# Patient Record
Sex: MALE | Race: OTHER | HISPANIC OR LATINO | Employment: UNEMPLOYED | URBAN - METROPOLITAN AREA
[De-identification: names, ages, dates, MRNs, and addresses within clinical notes are randomized per-mention and may not be internally consistent; named-entity substitution may affect disease eponyms.]

---

## 2018-10-09 ENCOUNTER — IMMUNIZATION (OUTPATIENT)
Dept: FAMILY MEDICINE CLINIC | Facility: CLINIC | Age: 28
End: 2018-10-09

## 2018-10-09 DIAGNOSIS — Z23 NEED FOR INFLUENZA VACCINATION: Primary | ICD-10-CM

## 2019-10-03 ENCOUNTER — IMMUNIZATIONS (OUTPATIENT)
Dept: FAMILY MEDICINE CLINIC | Facility: CLINIC | Age: 29
End: 2019-10-03
Payer: COMMERCIAL

## 2019-10-03 DIAGNOSIS — Z23 NEED FOR INFLUENZA VACCINATION: Primary | ICD-10-CM

## 2019-10-03 PROCEDURE — 90686 IIV4 VACC NO PRSV 0.5 ML IM: CPT

## 2019-10-03 PROCEDURE — 90471 IMMUNIZATION ADMIN: CPT

## 2020-10-08 ENCOUNTER — CLINICAL SUPPORT (OUTPATIENT)
Dept: FAMILY MEDICINE CLINIC | Facility: CLINIC | Age: 30
End: 2020-10-08
Payer: COMMERCIAL

## 2020-10-08 VITALS — TEMPERATURE: 97.1 F

## 2020-10-08 DIAGNOSIS — Z23 FLU VACCINE NEED: Primary | ICD-10-CM

## 2020-10-08 PROCEDURE — 90686 IIV4 VACC NO PRSV 0.5 ML IM: CPT

## 2020-10-08 PROCEDURE — 90471 IMMUNIZATION ADMIN: CPT

## 2020-10-27 ENCOUNTER — OFFICE VISIT (OUTPATIENT)
Dept: FAMILY MEDICINE CLINIC | Facility: CLINIC | Age: 30
End: 2020-10-27
Payer: COMMERCIAL

## 2020-10-27 VITALS
BODY MASS INDEX: 20.94 KG/M2 | OXYGEN SATURATION: 96 % | HEIGHT: 73 IN | SYSTOLIC BLOOD PRESSURE: 110 MMHG | DIASTOLIC BLOOD PRESSURE: 60 MMHG | HEART RATE: 70 BPM | WEIGHT: 158 LBS | TEMPERATURE: 98.2 F

## 2020-10-27 DIAGNOSIS — Z00.00 ENCOUNTER FOR ANNUAL PHYSICAL EXAM: Primary | ICD-10-CM

## 2020-10-27 DIAGNOSIS — J30.9 ALLERGIC RHINITIS, UNSPECIFIED SEASONALITY, UNSPECIFIED TRIGGER: ICD-10-CM

## 2020-10-27 DIAGNOSIS — Z23 NEED FOR TDAP VACCINATION: ICD-10-CM

## 2020-10-27 PROCEDURE — 90471 IMMUNIZATION ADMIN: CPT

## 2020-10-27 PROCEDURE — 99395 PREV VISIT EST AGE 18-39: CPT | Performed by: FAMILY MEDICINE

## 2020-10-27 PROCEDURE — 90715 TDAP VACCINE 7 YRS/> IM: CPT

## 2020-10-27 PROCEDURE — 3725F SCREEN DEPRESSION PERFORMED: CPT | Performed by: FAMILY MEDICINE

## 2020-10-27 PROCEDURE — 3008F BODY MASS INDEX DOCD: CPT | Performed by: FAMILY MEDICINE

## 2020-10-27 RX ORDER — ALBUTEROL SULFATE 90 UG/1
1-2 AEROSOL, METERED RESPIRATORY (INHALATION)
COMMUNITY
Start: 2012-10-02 | End: 2021-09-13 | Stop reason: SDUPTHER

## 2020-10-27 RX ORDER — LEVOCETIRIZINE DIHYDROCHLORIDE 5 MG/1
5 TABLET, FILM COATED ORAL EVERY EVENING
Qty: 90 TABLET | Refills: 1 | Status: SHIPPED | OUTPATIENT
Start: 2020-10-27

## 2021-04-13 DIAGNOSIS — Z23 ENCOUNTER FOR IMMUNIZATION: ICD-10-CM

## 2021-08-30 ENCOUNTER — APPOINTMENT (EMERGENCY)
Dept: RADIOLOGY | Facility: HOSPITAL | Age: 31
End: 2021-08-30
Payer: COMMERCIAL

## 2021-08-30 ENCOUNTER — HOSPITAL ENCOUNTER (EMERGENCY)
Facility: HOSPITAL | Age: 31
Discharge: HOME | End: 2021-08-30
Attending: EMERGENCY MEDICINE | Admitting: EMERGENCY MEDICINE
Payer: COMMERCIAL

## 2021-08-30 VITALS
OXYGEN SATURATION: 98 % | DIASTOLIC BLOOD PRESSURE: 72 MMHG | RESPIRATION RATE: 18 BRPM | TEMPERATURE: 98.8 F | SYSTOLIC BLOOD PRESSURE: 116 MMHG | HEART RATE: 76 BPM

## 2021-08-30 DIAGNOSIS — R07.89 ATYPICAL CHEST PAIN: Primary | ICD-10-CM

## 2021-08-30 LAB
ANION GAP SERPL CALCULATED.3IONS-SCNC: 7 MMOL/L (ref 4–13)
ATRIAL RATE: 76 BPM
BASOPHILS # BLD AUTO: 0.02 THOUSANDS/ΜL (ref 0–0.1)
BASOPHILS NFR BLD AUTO: 1 % (ref 0–1)
BUN SERPL-MCNC: 23 MG/DL (ref 5–25)
CALCIUM SERPL-MCNC: 9.1 MG/DL (ref 8.3–10.1)
CHLORIDE SERPL-SCNC: 103 MMOL/L (ref 100–108)
CO2 SERPL-SCNC: 30 MMOL/L (ref 21–32)
CREAT SERPL-MCNC: 0.82 MG/DL (ref 0.6–1.3)
EOSINOPHIL # BLD AUTO: 0.13 THOUSAND/ΜL (ref 0–0.61)
EOSINOPHIL NFR BLD AUTO: 4 % (ref 0–6)
ERYTHROCYTE [DISTWIDTH] IN BLOOD BY AUTOMATED COUNT: 11.7 % (ref 11.6–15.1)
GFR SERPL CREATININE-BSD FRML MDRD: 117 ML/MIN/1.73SQ M
GLUCOSE SERPL-MCNC: 98 MG/DL (ref 65–140)
HCT VFR BLD AUTO: 44.3 % (ref 36.5–49.3)
HGB BLD-MCNC: 14.9 G/DL (ref 12–17)
IMM GRANULOCYTES # BLD AUTO: 0 THOUSAND/UL (ref 0–0.2)
IMM GRANULOCYTES NFR BLD AUTO: 0 % (ref 0–2)
LYMPHOCYTES # BLD AUTO: 1.17 THOUSANDS/ΜL (ref 0.6–4.47)
LYMPHOCYTES NFR BLD AUTO: 40 % (ref 14–44)
MCH RBC QN AUTO: 33 PG (ref 26.8–34.3)
MCHC RBC AUTO-ENTMCNC: 33.6 G/DL (ref 31.4–37.4)
MCV RBC AUTO: 98 FL (ref 82–98)
MONOCYTES # BLD AUTO: 0.6 THOUSAND/ΜL (ref 0.17–1.22)
MONOCYTES NFR BLD AUTO: 20 % (ref 4–12)
NEUTROPHILS # BLD AUTO: 1.04 THOUSANDS/ΜL (ref 1.85–7.62)
NEUTS SEG NFR BLD AUTO: 35 % (ref 43–75)
NRBC BLD AUTO-RTO: 0 /100 WBCS
P AXIS: 71 DEGREES
PLATELET # BLD AUTO: 132 THOUSANDS/UL (ref 149–390)
PMV BLD AUTO: 11.9 FL (ref 8.9–12.7)
POTASSIUM SERPL-SCNC: 4.1 MMOL/L (ref 3.5–5.3)
PR INTERVAL: 166 MS
QRS AXIS: 96 DEGREES
QRSD INTERVAL: 102 MS
QT INTERVAL: 376 MS
QTC INTERVAL: 423 MS
RBC # BLD AUTO: 4.52 MILLION/UL (ref 3.88–5.62)
SODIUM SERPL-SCNC: 140 MMOL/L (ref 136–145)
T WAVE AXIS: 75 DEGREES
TROPONIN I SERPL-MCNC: <0.02 NG/ML
VENTRICULAR RATE: 76 BPM
WBC # BLD AUTO: 2.96 THOUSAND/UL (ref 4.31–10.16)

## 2021-08-30 PROCEDURE — 84484 ASSAY OF TROPONIN QUANT: CPT | Performed by: EMERGENCY MEDICINE

## 2021-08-30 PROCEDURE — 93010 ELECTROCARDIOGRAM REPORT: CPT | Performed by: INTERNAL MEDICINE

## 2021-08-30 PROCEDURE — 99284 EMERGENCY DEPT VISIT MOD MDM: CPT | Performed by: EMERGENCY MEDICINE

## 2021-08-30 PROCEDURE — 93005 ELECTROCARDIOGRAM TRACING: CPT

## 2021-08-30 PROCEDURE — 80048 BASIC METABOLIC PNL TOTAL CA: CPT | Performed by: EMERGENCY MEDICINE

## 2021-08-30 PROCEDURE — 99285 EMERGENCY DEPT VISIT HI MDM: CPT

## 2021-08-30 PROCEDURE — 36415 COLL VENOUS BLD VENIPUNCTURE: CPT | Performed by: EMERGENCY MEDICINE

## 2021-08-30 PROCEDURE — 71046 X-RAY EXAM CHEST 2 VIEWS: CPT

## 2021-08-30 PROCEDURE — 85025 COMPLETE CBC W/AUTO DIFF WBC: CPT | Performed by: EMERGENCY MEDICINE

## 2021-08-30 NOTE — ED PROVIDER NOTES
Emergency Department Note- [facility] 27 y o  male MRN: [id number]    Unit/Bed#: ED 30 Encounter: [id number]        History of Present Illness     Patient is a 42-year-old male patient, 10:00 p m  Last evening began feeling his chest was somewhat heavy, felt like he had just exercise when he had not  He said overall he thinks he is getting better now but still has some slight heaviness  The discomfort just feels like a pressure, it is not pleuritic in nature, not knife-like, ripping, or tearing  Discomfort is mild in severity, worse with nothing and better with nothing  Does not radiate anywhere  No associated nausea or vomiting or diaphoresis  Says he has a remote history of asthma, used his inhaler once about 2:00 a m  To see if that would help but it did not seem to help  He has no travel history, no sick contacts, no recent hospitalizations  Patient denies any prolonged travel history, no recent long travel, no immobilizations, or hospitalizations  Is vaccinated against COVID, 2nd dose was April 2021      REVIEW OF SYSTEMS   Constitutional:  No recent weight  gains or losses   Eyes:  No visual changes   ENT:  No tinnitus or hearing changes   Cardiac: As per HPI   Respiratory:  As per HPI   Abdominal:  No nausea or vomiting   Urinary: No dysuria or hematuria   Hematologic: No easy bruising or bleeding   Skin: No rash   Musculoskeletal: No aches or pains   Neurologic: No weakness or sensory changes   Psychiatric: No mood changes      Historical Information   Past Medical History:   Diagnosis Date    Asthma      History reviewed  No pertinent surgical history    Social History   Social History     Substance and Sexual Activity   Alcohol Use Not Currently     Social History     Substance and Sexual Activity   Drug Use Never     Social History     Tobacco Use   Smoking Status Never Smoker   Smokeless Tobacco Never Used     Family History:   Family History   Problem Relation Age of Onset    Arthritis Mother     Hypertension Father     Stroke Maternal Grandfather        MEDICATIONS:  No current facility-administered medications on file prior to encounter       Current Outpatient Medications on File Prior to Encounter   Medication Sig Dispense Refill    albuterol (Ventolin HFA) 90 mcg/act inhaler Inhale 1-2 puffs      levocetirizine (XYZAL) 5 MG tablet Take 1 tablet (5 mg total) by mouth every evening ( for allergies ) 90 tablet 1     ALLERGIES:  No Known Allergies    Vitals:    08/30/21 1429   BP: 137/80   TempSrc: Oral   Pulse: 73   Resp: 16   Patient Position - Orthostatic VS: Lying   Temp: 98 8 °F (37 1 °C)       PHYSICAL EXAM    General:  Patient is well-appearing  Head:  Atraumatic  Eyes:  Conjunctiva pink  ENT:  Mucous membranes are moist  Neck:  Supple  Cardiac:  S1-S2, without murmurs  Lungs:  Clear to auscultation bilaterally  Abdomen:  Soft, nontender, normal bowel sounds, no CVA tenderness, no tympany, no rigidity, no guarding  Extremities:  Normal range of motion, no pedal edema or calf asymmetry, radial pulses are equal and symmetric bilaterally  Neurologic:  Awake, fluent speech, normal comprehension, AAOx3  Skin:  Pink warm and dry  Psychiatric:  Alert, pleasant, cooperative      Labs Reviewed   CBC AND DIFFERENTIAL - Abnormal       Result Value Ref Range Status    WBC 2 96 (*) 4 31 - 10 16 Thousand/uL Final    RBC 4 52  3 88 - 5 62 Million/uL Final    Hemoglobin 14 9  12 0 - 17 0 g/dL Final    Hematocrit 44 3  36 5 - 49 3 % Final    MCV 98  82 - 98 fL Final    MCH 33 0  26 8 - 34 3 pg Final    MCHC 33 6  31 4 - 37 4 g/dL Final    RDW 11 7  11 6 - 15 1 % Final    MPV 11 9  8 9 - 12 7 fL Final    Platelets 744 (*) 829 - 390 Thousands/uL Final    nRBC 0  /100 WBCs Final    Neutrophils Relative 35 (*) 43 - 75 % Final    Immat GRANS % 0  0 - 2 % Final    Lymphocytes Relative 40  14 - 44 % Final    Monocytes Relative 20 (*) 4 - 12 % Final    Eosinophils Relative 4  0 - 6 % Final    Basophils Relative 1  0 - 1 % Final    Neutrophils Absolute 1 04 (*) 1 85 - 7 62 Thousands/µL Final    Immature Grans Absolute 0 00  0 00 - 0 20 Thousand/uL Final    Lymphocytes Absolute 1 17  0 60 - 4 47 Thousands/µL Final    Monocytes Absolute 0 60  0 17 - 1 22 Thousand/µL Final    Eosinophils Absolute 0 13  0 00 - 0 61 Thousand/µL Final    Basophils Absolute 0 02  0 00 - 0 10 Thousands/µL Final   TROPONIN I - Normal    Troponin I <0 02  <=0 04 ng/mL Final    Comment: 3Autovalidation override  [employer] Chemistry analyzer 99% cutoff is > 0 04 ng/mL in network labs     o cTnI 99% cutoff is useful only when applied to patients in the clinical setting of myocardial ischemia   o cTnI 99% cutoff should be interpreted in the context of clinical history, ECG findings and possibly cardiac imaging to establish correct diagnosis  o cTnI 99% cutoff may be suggestive but clearly not indicative of a coronary event without the clinical setting of myocardial ischemia  BASIC METABOLIC PANEL    Sodium 638  136 - 145 mmol/L Final    Potassium 4 1  3 5 - 5 3 mmol/L Final    Chloride 103  100 - 108 mmol/L Final    CO2 30  21 - 32 mmol/L Final    ANION GAP 7  4 - 13 mmol/L Final    BUN 23  5 - 25 mg/dL Final    Creatinine 0 82  0 60 - 1 30 mg/dL Final    Comment: Standardized to IDMS reference method    Glucose 98  65 - 140 mg/dL Final    Comment: If the patient is fasting, the ADA then defines impaired fasting glucose as > 100 mg/dL and diabetes as > or equal to 123 mg/dL  Specimen collection should occur prior to Sulfasalazine administration due to the potential for falsely depressed results  Specimen collection should occur prior to Sulfapyridine administration due to the potential for falsely elevated results      Calcium 9 1  8 3 - 10 1 mg/dL Final    eGFR 117  ml/min/1 73sq m Final    Narrative:     [place] guidelines for Chronic Kidney Disease (CKD):     Stage 1 with normal or high GFR (GFR > 90 mL/min/1 73 square meters)    Stage 2 Mild CKD (GFR = 60-89 mL/min/1 73 square meters)    Stage 3A Moderate CKD (GFR = 45-59 mL/min/1 73 square meters)    Stage 3B Moderate CKD (GFR = 30-44 mL/min/1 73 square meters)    Stage 4 Severe CKD (GFR = 15-29 mL/min/1 73 square meters)    Stage 5 End Stage CKD (GFR <15 mL/min/1 73 square meters)  Note: GFR calculation is accurate only with a steady state creatinine       Medications - No data to display    XR chest pa & lateral   ED Interpretation   Chest x-ray interpreted me shows no acute cardiopulmonary disease, no mediastinal widening, no old available for comparison          ED Course as of Aug 30 1619   Mon Aug 30, 2021   1546 ECG interpreted by me, sinus rhythm, rate of 76, no ST segment elevation or depression, no ischemic or infarct changes, no old available for comparison      1546 Patient is a very slight thrombocytopenia and neutropenia, no old lab eyes available for comparison      1550 On reassessment, no change the above findings      1605 On reassessment, there is no change with the above findings  Discussed with the patient about the slightly low platelet count and white blood cell count  Stressed the importance of following with primary care physician during reassessment with regards to this  Assessment/Plan     ED Medical Decision Making:    Based on the HEART score of 0, the patient is at low risk (1 7% or less) for major adverse cardiac events (MACE) in the next 6 weeks  The risks of MACE, the potential benefits of hospitalization (increased diagnostic accurary) as well as potential harms of hospitalization (iatrogenic injury, false positive test results, nococomial infection risk) were discussed  Based on current guidelines, I believe that the best course of action would be to discharge the patient and follow up as an outpatient   The patient said they understood that even with the low HEART score there was the small potential that their current symptoms were due to a cardiac event, but they were comfortable with the low risk and they decided that they wanted to be discharged from the ED and follow up as an outpatient  I do not believe this patient's complaints are from pulmonary embolism or aortic dissection and I believe they would most likely be harmed through false positive test results and other associated test complications by further pursuing the diagnosis of pulmonary embolism or dissection  While the cause of the patient's complaints is most likely benign, it is possible that this is the early presentation of a more serious condition  This diagnostic uncertainty was discussed with the patient, the importance of follow up care, as well as the need to return to immediately return to the closest emergency department for concerning signs and symptoms  The patient stated they were aware of this diagnostic uncertainty, understood the importance of follow up and were comfortable being discharged  I believe that discharge home with outpatient follow up for further evaluation is medically appropriate  Supportive care, importance of follow-up and return precautions were discussed with the patient, who expressed understanding  Time reflects when diagnosis was documented in both MDM as applicable and the Disposition within this note     Time User Action Codes Description Comment    8/30/2021  4:16 PM Mary Grace Tran [R07 [id number]] Atypical chest pain       ED Disposition     ED Disposition Condition Date/Time Comment    Discharge Stable Mon Aug 30, 2021  4:16 PM [facility] discharge to home/self care              Follow-up Information     Follow up With Specialties Details Why Contact Info    Kashif Smith DO Family Medicine  Schedule an appointment to be seen for follow-up in the next 3-5 days [address] [facility] [address]  [phone]            New Prescriptions    No medications on file            Graciela Samayoa DO  08/30/21 1449 [facility]

## 2021-08-31 ENCOUNTER — OFFICE VISIT (OUTPATIENT)
Dept: FAMILY MEDICINE CLINIC | Facility: CLINIC | Age: 31
End: 2021-08-31
Payer: COMMERCIAL

## 2021-08-31 VITALS
HEIGHT: 73 IN | BODY MASS INDEX: 20.67 KG/M2 | WEIGHT: 156 LBS | OXYGEN SATURATION: 97 % | SYSTOLIC BLOOD PRESSURE: 112 MMHG | HEART RATE: 62 BPM | DIASTOLIC BLOOD PRESSURE: 80 MMHG

## 2021-08-31 DIAGNOSIS — R07.89 ATYPICAL CHEST PAIN: ICD-10-CM

## 2021-08-31 DIAGNOSIS — J45.40 MODERATE PERSISTENT REACTIVE AIRWAY DISEASE WITHOUT COMPLICATION: ICD-10-CM

## 2021-08-31 DIAGNOSIS — D69.6 THROMBOCYTOPENIA (HCC): ICD-10-CM

## 2021-08-31 PROCEDURE — 99213 OFFICE O/P EST LOW 20 MIN: CPT | Performed by: INTERNAL MEDICINE

## 2021-08-31 PROCEDURE — 3725F SCREEN DEPRESSION PERFORMED: CPT | Performed by: INTERNAL MEDICINE

## 2021-08-31 PROCEDURE — 3008F BODY MASS INDEX DOCD: CPT | Performed by: INTERNAL MEDICINE

## 2021-08-31 PROCEDURE — 1036F TOBACCO NON-USER: CPT | Performed by: INTERNAL MEDICINE

## 2021-08-31 NOTE — PROGRESS NOTES
Assessment/Plan:    No problem-specific Assessment & Plan notes found for this encounter  Diagnoses and all orders for this visit:    BMI 20 0-20 9, adult    Thrombocytopenia ([facility] [address] )  Comments:  Repeat CBC in 6 months  Orders:  -     CBC and differential; Future    Moderate persistent reactive airway disease without complication  Comments:  Relatively well controlled  Continue albuterol as needed  Atypical chest pain  Comments:  Negative troponin, EKG and x-ray of the chest   No exertional symptoms  Can be due to GERD/esophageal spasm  He will let us know if it will recur  Subjective:      Patient ID: Jewell Springer is a 27 y o  male  Patient came to follow-up after his recent visit to emergency room, he had episode of substernal pressure-like chest pain when he woke up at night  His troponin was negative  EKG did not reveal any acute changes  X-ray of his chest was also negative  He exercises regularly and he never had any exertional symptoms in terms of the chest pain, shortness of breath or palpitations  His blood work revealed normal electrolytes and kidney function, his cell count showed decreased white blood cell count and platelets  The following portions of the patient's history were reviewed and updated as appropriate: allergies, current medications, past family history, past medical history, past social history, past surgical history, and problem list     Review of Systems   Constitutional: Negative for chills, fatigue and fever  Respiratory: Negative for cough, chest tightness and shortness of breath  Cardiovascular: Negative for chest pain, palpitations and leg swelling  Gastrointestinal: Negative for abdominal distention, abdominal pain, blood in stool, constipation, diarrhea and nausea  Genitourinary: Negative for difficulty urinating and dysuria  Musculoskeletal: Negative for arthralgias, back pain, gait problem, joint swelling, myalgias and neck pain  Skin: Negative for rash  Neurological: Negative for dizziness, weakness, numbness and headaches  Psychiatric/Behavioral: Negative for agitation  Objective:      /80 (BP Location: Left arm, Patient Position: Sitting, Cuff Size: Adult)   Pulse 62   Ht 6' 1" (1 854 m)   Wt 70 8 kg (156 lb)   SpO2 97%   BMI 20 58 kg/m²          Physical Exam  Constitutional:       Appearance: He is not ill-appearing  HENT:      Head: Normocephalic and atraumatic  Nose: No congestion or rhinorrhea  Eyes:      Pupils: Pupils are equal, round, and reactive to light  Cardiovascular:      Rate and Rhythm: Normal rate and regular rhythm  Pulses: Normal pulses  Heart sounds: Normal heart sounds  No murmur heard  No friction rub  No gallop  Pulmonary:      Effort: Pulmonary effort is normal  No respiratory distress  Breath sounds: Normal breath sounds  No wheezing or rales  Chest:      Chest wall: No tenderness  Abdominal:      General: Bowel sounds are normal  There is no distension  Palpations: Abdomen is soft  There is no mass  Tenderness: There is no abdominal tenderness  There is no rebound  Hernia: No hernia is present  Musculoskeletal:         General: No swelling, tenderness or deformity  Cervical back: No rigidity  No muscular tenderness  Skin:     Coloration: Skin is not pale  Findings: No erythema, lesion or rash  Neurological:      Mental Status: He is alert and oriented to person, place, and time  Sensory: No sensory deficit  Motor: No weakness     Psychiatric:         Mood and Affect: Mood normal          Behavior: Behavior normal                Current Outpatient Medications:     albuterol (Ventolin HFA) 90 mcg/act inhaler, Inhale 1-2 puffs, Disp: , Rfl:     levocetirizine (XYZAL) 5 MG tablet, Take 1 tablet (5 mg total) by mouth every evening ( for allergies ), Disp: 90 tablet, Rfl: 1

## 2021-09-13 ENCOUNTER — OFFICE VISIT (OUTPATIENT)
Dept: FAMILY MEDICINE CLINIC | Facility: CLINIC | Age: 31
End: 2021-09-13
Payer: COMMERCIAL

## 2021-09-13 VITALS
HEART RATE: 70 BPM | BODY MASS INDEX: 20.67 KG/M2 | DIASTOLIC BLOOD PRESSURE: 68 MMHG | SYSTOLIC BLOOD PRESSURE: 112 MMHG | OXYGEN SATURATION: 98 % | WEIGHT: 156 LBS | HEIGHT: 73 IN | TEMPERATURE: 97.5 F

## 2021-09-13 DIAGNOSIS — J45.20 MILD INTERMITTENT REACTIVE AIRWAY DISEASE WITHOUT COMPLICATION: ICD-10-CM

## 2021-09-13 DIAGNOSIS — R09.89 THROAT TIGHTNESS: Primary | ICD-10-CM

## 2021-09-13 DIAGNOSIS — D70.9 NEUTROPENIA, UNSPECIFIED TYPE (HCC): ICD-10-CM

## 2021-09-13 PROCEDURE — 99213 OFFICE O/P EST LOW 20 MIN: CPT | Performed by: FAMILY MEDICINE

## 2021-09-13 RX ORDER — ALBUTEROL SULFATE 90 UG/1
1-2 AEROSOL, METERED RESPIRATORY (INHALATION) EVERY 6 HOURS PRN
Qty: 8 G | Refills: 0 | Status: SHIPPED | OUTPATIENT
Start: 2021-09-13

## 2021-09-13 NOTE — PROGRESS NOTES
FAMILY PRACTICE OFFICE VISIT  [facility]  [address]  [address], [place], [address]      NAME: Malik Palma  AGE: 27 y o  SEX: male  : 1990   MRN: [id number]    DATE: 2021  TIME: 12:24 PM    Assessment and Plan     Problem List Items Addressed This Visit        Respiratory    Reactive airway disease    Relevant Medications    albuterol (Ventolin HFA) 90 mcg/act inhaler      Other Visit Diagnoses     Throat tightness - watch for reflux    -  Primary    Neutropenia, unspecified type [facility])              Patient Instructions   Reviewed emergency room visit , he had seen Dr Lynette Cortes here -  has slip to redo CBC in about 5 to 6 months regarding WBC 2 96, chest x-ray, EKG were fine  Regarding throat tightness, if associated with wheezing he can use albuterol rescue inhaler  He has no exertional complaints, continue with routine exercise  I also want him to watch for acid reflux, may be having silent reflux, try not the late, watch association with red sauces, if symptoms do recur can do trial famotidine/Pepcid OTC 20 mg every evening  He will call us if symptoms persist    He did receive COVID vaccines      Chief Complaint     Chief Complaint   Patient presents with    Difficulty breating    Asthma       History of Present [address] is a 27y o -year-old male who is in to discuss throat tightness, had been seen at the emergency room, testing was unremarkable other than mildly decreased WBC  He did see other provider here following day  He had another episode with awakening at night, throat was tight, had difficulty swallowing  Does use water to swallow at night when he gets this sensation  Does not have tongue swelling, lip swelling  Denies true acid wash although has some vague taste sensation  Has tried rescue inhaler, inhaler was old, he is unsure if it helped    Has perhaps mild wheezing at times at night  He is very active with exercise, he actually feels better when he is out, has no exertional wheezing or shortness of breath  Denies anxiety/panic sensation other than associated with difficulty swallowing      Review of Systems   Review of Systems   Constitutional: Negative for appetite change, fatigue, fever and unexpected weight change  HENT: Positive for trouble swallowing (See HPI)  Negative for sore throat  Does use antihistamine on occasion   Respiratory: Positive for chest tightness (See HPI)  Negative for cough and shortness of breath  Cardiovascular: Negative for chest pain, palpitations and leg swelling  Gastrointestinal: Negative for abdominal pain, blood in stool, nausea and vomiting  No acid reflux     No change in bowel   Genitourinary: Negative for dysuria and hematuria  Neurological: Negative for dizziness, syncope, light-headedness and headaches  Psychiatric/Behavioral: Negative for behavioral problems and confusion  Active Problem List     Patient Active Problem List   Diagnosis    Reactive airway disease    Allergic rhinitis       Past Medical History:  Reviewed    Past Surgical History:  Reviewed    Family History:  Reviewed    Social History:  Reviewed    Objective     Vitals:    09/13/21 1011   BP: 112/68   BP Location: Left arm   Patient Position: Sitting   Cuff Size: Standard   Pulse: 70   Temp: 97 5 °F (36 4 °C)   SpO2: 98%   Weight: 70 8 kg (156 lb)   Height: 6' 1" (1 854 m)     Body mass index is 20 58 kg/m²  BP Readings from Last 3 Encounters:   09/13/21 112/68   08/31/21 112/80   08/30/21 116/72       Wt Readings from Last 3 Encounters:   09/13/21 70 8 kg (156 lb)   08/31/21 70 8 kg (156 lb)   10/27/20 71 7 kg (158 lb)       Physical Exam  Constitutional:       General: He is not in acute distress  Appearance: Normal appearance  He is well-developed  He is not ill-appearing     HENT:      Mouth/Throat:      Mouth: Mucous membranes are moist       Pharynx: Oropharynx is clear  No oropharyngeal exudate  Eyes:      General: No scleral icterus  Neck:      Vascular: No carotid bruit  Cardiovascular:      Rate and Rhythm: Normal rate and regular rhythm  Heart sounds: Normal heart sounds  No murmur heard  Pulmonary:      Effort: Pulmonary effort is normal  No respiratory distress  Breath sounds: Normal breath sounds  Abdominal:      Palpations: Abdomen is soft  Tenderness: There is no abdominal tenderness  Musculoskeletal:      Right lower leg: No edema  Left lower leg: No edema  Lymphadenopathy:      Cervical: No cervical adenopathy  Skin:     Coloration: Skin is not jaundiced  Neurological:      Mental Status: He is alert and oriented to person, place, and time  Psychiatric:         Mood and Affect: Mood normal          Behavior: Behavior normal          ALLERGIES:  No Known Allergies    Current Medications     Current Outpatient Medications   Medication Sig Dispense Refill    albuterol (Ventolin HFA) 90 mcg/act inhaler Inhale 1-2 puffs every 6 (six) hours as needed for wheezing 8 g 0    levocetirizine (XYZAL) 5 MG tablet Take 1 tablet (5 mg total) by mouth every evening ( for allergies ) 90 tablet 1     No current facility-administered medications for this visit  No orders of the defined types were placed in this encounter          Kashif Smith DO

## 2021-09-13 NOTE — PATIENT INSTRUCTIONS
Reviewed emergency room visit August 30th, he had seen Dr Kimberley Fontana here 8/31-  has slip to redo CBC in about 5 to 6 months regarding WBC 2 96, chest x-ray, EKG were fine  Regarding throat tightness, if associated with wheezing he can use albuterol rescue inhaler  He has no exertional complaints, continue with routine exercise  I also want him to watch for acid reflux, may be having silent reflux, try not the late, watch association with red sauces, if symptoms do recur can do trial famotidine/Pepcid OTC 20 mg every evening    He will call us if symptoms persist    He did receive COVID vaccines

## 2021-09-18 ENCOUNTER — HOSPITAL ENCOUNTER (EMERGENCY)
Facility: HOSPITAL | Age: 31
Discharge: HOME | End: 2021-09-18
Attending: EMERGENCY MEDICINE
Payer: COMMERCIAL

## 2021-09-18 VITALS
DIASTOLIC BLOOD PRESSURE: 66 MMHG | TEMPERATURE: 97.4 F | SYSTOLIC BLOOD PRESSURE: 132 MMHG | OXYGEN SATURATION: 98 % | HEART RATE: 68 BPM | RESPIRATION RATE: 20 BRPM

## 2021-09-18 DIAGNOSIS — R09.89 THROAT TIGHTNESS: ICD-10-CM

## 2021-09-18 DIAGNOSIS — R09.89 GLOBUS PHARYNGEUS: Primary | ICD-10-CM

## 2021-09-18 PROCEDURE — 99284 EMERGENCY DEPT VISIT MOD MDM: CPT | Performed by: EMERGENCY MEDICINE

## 2021-09-18 PROCEDURE — 99282 EMERGENCY DEPT VISIT SF MDM: CPT

## 2021-09-18 RX ORDER — HYDROXYZINE HYDROCHLORIDE 25 MG/1
25 TABLET, FILM COATED ORAL EVERY 6 HOURS PRN
Qty: 12 TABLET | Refills: 0 | Status: SHIPPED | OUTPATIENT
Start: 2021-09-18 | End: 2021-10-07 | Stop reason: SINTOL

## 2021-09-20 ENCOUNTER — OFFICE VISIT (OUTPATIENT)
Dept: FAMILY MEDICINE CLINIC | Facility: CLINIC | Age: 31
End: 2021-09-20
Payer: COMMERCIAL

## 2021-09-20 VITALS
HEIGHT: 73 IN | SYSTOLIC BLOOD PRESSURE: 112 MMHG | DIASTOLIC BLOOD PRESSURE: 70 MMHG | WEIGHT: 155 LBS | BODY MASS INDEX: 20.54 KG/M2 | OXYGEN SATURATION: 94 % | HEART RATE: 71 BPM | TEMPERATURE: 97.5 F

## 2021-09-20 DIAGNOSIS — K21.9 GASTROESOPHAGEAL REFLUX DISEASE WITHOUT ESOPHAGITIS: ICD-10-CM

## 2021-09-20 DIAGNOSIS — R09.89 GLOBUS SENSATION: Primary | ICD-10-CM

## 2021-09-20 PROBLEM — R09.A2 GLOBUS SENSATION: Status: ACTIVE | Noted: 2021-09-20

## 2021-09-20 PROCEDURE — 1036F TOBACCO NON-USER: CPT | Performed by: FAMILY MEDICINE

## 2021-09-20 PROCEDURE — 99213 OFFICE O/P EST LOW 20 MIN: CPT | Performed by: FAMILY MEDICINE

## 2021-09-20 PROCEDURE — 3008F BODY MASS INDEX DOCD: CPT | Performed by: FAMILY MEDICINE

## 2021-09-20 RX ORDER — PANTOPRAZOLE SODIUM 20 MG/1
20 TABLET, DELAYED RELEASE ORAL DAILY
Qty: 30 TABLET | Refills: 0 | Status: SHIPPED | OUTPATIENT
Start: 2021-09-20 | End: 2021-10-07 | Stop reason: SDUPTHER

## 2021-09-20 NOTE — PATIENT INSTRUCTIONS
See recent visit with me, he did have another emergency room visit with globus sensation and throat, currently resolved  He did do some jogging yesterday, try to ride his bike  Episodes not associated with certain foods, no swelling of lips, eyelid, no hives  Did find some benefit with PPI over-the-counter, is concerned he may have reacted to that  He can use pantoprazole once daily for 2 weeks to suppress acid, after that use famotidine/Pepcid as needed  Does have albuterol inhaler to use as needed for wheezing  Also received prescription for Hydroxyzine 25 mg from emergency room, can use that if at home for throat sensation/anxiety associated with same  No neck mass on exam here today, see no need for imaging  If does persist he can see ENT as advised by emergency room    Has not reqd Xyzal in quite some time ( anti-histamine)    Also has a last visit he will redo CBC in 5 to 6 months

## 2021-09-20 NOTE — PROGRESS NOTES
FAMILY PRACTICE OFFICE VISIT  [facility]  [address] [facility] [address]  [address], [place], [address]      NAME: Amaya Chauhan  AGE: 27 y o  SEX: male  : 1990   MRN: [id number]    DATE: 2021  TIME: 8:53 AM    Assessment and Plan     Problem List Items Addressed This Visit        Digestive    Gastroesophageal reflux disease     Relevant Medications    pantoprazole (PROTONIX) 20 mg tablet       Other    Globus sensation throat - Primary          Patient Instructions   See recent visit with me, he did have another emergency room visit with globus sensation and throat, currently resolved  He did do some jogging yesterday, try to ride his bike  Episodes not associated with certain foods, no swelling of lips, eyelid, no hives  Did find some benefit with PPI over-the-counter, is concerned he may have reacted to that  He can use pantoprazole once daily for 2 weeks to suppress acid, after that use famotidine/Pepcid as needed  Does have albuterol inhaler to use as needed for wheezing  Also received prescription for Hydroxyzine 25 mg from emergency room, can use that if at home for throat sensation/anxiety associated with same  No neck mass on exam here today, see no need for imaging  If does persist he can see ENT as advised by emergency room    Has not reqd Xyzal in quite some time ( anti-histamine)    Also has a last visit he will redo CBC in 5 to 6 months  Chief Complaint     Chief Complaint   Patient presents with    ER fallow up    Sore Throat     specially when eating        History of Present [address]rea is a 27y o -year-old male who is in for a re-evaluation after another emergency room visit, see report, see recent visit  Had tightness in throat      Did note benefit with substernal burning with using omeprazole but he was concerned he was reacting to it      Review of Systems   Review of Systems Constitutional: Negative for appetite change, fatigue, fever and unexpected weight change  HENT: Negative for sore throat  Respiratory: Negative for cough, chest tightness and shortness of breath  Cardiovascular: Negative for chest pain, palpitations and leg swelling  Gastrointestinal: Negative for abdominal pain, blood in stool, nausea and vomiting  No true dysphagia  Does note some substernal burning w taste alteration   No change in bowel   Genitourinary: Negative for dysuria and hematuria  Neurological: Negative for dizziness, syncope, light-headedness and headaches  Psychiatric/Behavioral: Negative for behavioral problems and confusion  Active Problem List     Patient Active Problem List   Diagnosis    Reactive airway disease    Allergic rhinitis    Gastroesophageal reflux disease     Globus sensation throat       Past Medical History:  Reviewed    Past Surgical History:  Reviewed    Family History:  Reviewed    Social History:  Reviewed    Objective     Vitals:    09/20/21 0814   BP: 112/70   BP Location: Left arm   Patient Position: Sitting   Cuff Size: Standard   Pulse: 71   Temp: 97 5 °F (36 4 °C)   SpO2: 94%   Weight: 70 3 kg (155 lb)   Height: 6' 1" (1 854 m)     Body mass index is 20 45 kg/m²  BP Readings from Last 3 Encounters:   09/20/21 112/70   09/18/21 132/66   09/13/21 112/68       Wt Readings from Last 3 Encounters:   09/20/21 70 3 kg (155 lb)   09/13/21 70 8 kg (156 lb)   08/31/21 70 8 kg (156 lb)       Physical Exam  Constitutional:       General: He is not in acute distress  Appearance: Normal appearance  He is well-developed  He is not ill-appearing  HENT:      Mouth/Throat:      Mouth: Mucous membranes are moist       Pharynx: Oropharynx is clear  Eyes:      General: No scleral icterus  Neck:      Comments: No neck mass  Cardiovascular:      Rate and Rhythm: Normal rate and regular rhythm  Heart sounds: Normal heart sounds  No murmur heard  Pulmonary:      Effort: Pulmonary effort is normal  No respiratory distress  Breath sounds: Normal breath sounds  Abdominal:      Palpations: Abdomen is soft  Tenderness: There is no abdominal tenderness  Lymphadenopathy:      Cervical: No cervical adenopathy  Skin:     Coloration: Skin is not jaundiced  Neurological:      Mental Status: He is alert and oriented to person, place, and time  Psychiatric:         Behavior: Behavior normal          ALLERGIES:  No Known Allergies    Current Medications     Current Outpatient Medications   Medication Sig Dispense Refill    albuterol (Ventolin HFA) 90 mcg/act inhaler Inhale 1-2 puffs every 6 (six) hours as needed for wheezing 8 g 0    hydrOXYzine HCL (ATARAX) 25 mg tablet Take 1 tablet (25 mg total) by mouth every 6 (six) hours as needed for anxiety (Patient not taking: Reported on 9/20/2021) 12 tablet 0    levocetirizine (XYZAL) 5 MG tablet Take 1 tablet (5 mg total) by mouth every evening ( for allergies ) (Patient not taking: Reported on 9/20/2021) 90 tablet 1    pantoprazole (PROTONIX) 20 mg tablet Take 1 tablet (20 mg total) by mouth daily for 2 weeks, then use as needed regarding acid reflux 30 tablet 0     No current facility-administered medications for this visit  No orders of the defined types were placed in this encounter          Nahun Soto DO

## 2021-10-07 ENCOUNTER — APPOINTMENT (OUTPATIENT)
Dept: LAB | Facility: MEDICAL CENTER | Age: 31
End: 2021-10-07
Payer: COMMERCIAL

## 2021-10-07 ENCOUNTER — OFFICE VISIT (OUTPATIENT)
Dept: FAMILY MEDICINE CLINIC | Facility: CLINIC | Age: 31
End: 2021-10-07
Payer: COMMERCIAL

## 2021-10-07 VITALS
HEIGHT: 73 IN | SYSTOLIC BLOOD PRESSURE: 118 MMHG | HEART RATE: 67 BPM | WEIGHT: 153 LBS | TEMPERATURE: 97.7 F | DIASTOLIC BLOOD PRESSURE: 68 MMHG | BODY MASS INDEX: 20.28 KG/M2 | OXYGEN SATURATION: 97 %

## 2021-10-07 DIAGNOSIS — F41.8 SITUATIONAL ANXIETY: ICD-10-CM

## 2021-10-07 DIAGNOSIS — R13.10 DYSPHAGIA, UNSPECIFIED TYPE: Primary | ICD-10-CM

## 2021-10-07 DIAGNOSIS — K21.9 GASTROESOPHAGEAL REFLUX DISEASE WITHOUT ESOPHAGITIS: ICD-10-CM

## 2021-10-07 DIAGNOSIS — D72.819 LEUKOPENIA, UNSPECIFIED TYPE: ICD-10-CM

## 2021-10-07 DIAGNOSIS — R09.89 GLOBUS SENSATION: ICD-10-CM

## 2021-10-07 LAB
ERYTHROCYTE [DISTWIDTH] IN BLOOD BY AUTOMATED COUNT: 11.5 % (ref 11.6–15.1)
HCT VFR BLD AUTO: 45.2 % (ref 36.5–49.3)
HGB BLD-MCNC: 15.2 G/DL (ref 12–17)
MCH RBC QN AUTO: 32.5 PG (ref 26.8–34.3)
MCHC RBC AUTO-ENTMCNC: 33.6 G/DL (ref 31.4–37.4)
MCV RBC AUTO: 97 FL (ref 82–98)
PLATELET # BLD AUTO: 153 THOUSANDS/UL (ref 149–390)
PMV BLD AUTO: 12.2 FL (ref 8.9–12.7)
RBC # BLD AUTO: 4.68 MILLION/UL (ref 3.88–5.62)
WBC # BLD AUTO: 2.75 THOUSAND/UL (ref 4.31–10.16)

## 2021-10-07 PROCEDURE — 85027 COMPLETE CBC AUTOMATED: CPT | Performed by: FAMILY MEDICINE

## 2021-10-07 PROCEDURE — 36415 COLL VENOUS BLD VENIPUNCTURE: CPT | Performed by: FAMILY MEDICINE

## 2021-10-07 PROCEDURE — 99213 OFFICE O/P EST LOW 20 MIN: CPT | Performed by: FAMILY MEDICINE

## 2021-10-07 RX ORDER — PANTOPRAZOLE SODIUM 40 MG/1
40 TABLET, DELAYED RELEASE ORAL DAILY
Qty: 30 TABLET | Refills: 1 | Status: SHIPPED | OUTPATIENT
Start: 2021-10-07 | End: 2021-10-18 | Stop reason: SDUPTHER

## 2021-10-10 PROBLEM — D72.819 LEUKOCYTOPENIA: Status: ACTIVE | Noted: 2021-10-10

## 2021-10-15 ENCOUNTER — HOSPITAL ENCOUNTER (OUTPATIENT)
Dept: RADIOLOGY | Facility: HOSPITAL | Age: 31
Discharge: HOME | End: 2021-10-15
Payer: COMMERCIAL

## 2021-10-15 DIAGNOSIS — R13.10 DYSPHAGIA, UNSPECIFIED TYPE: ICD-10-CM

## 2021-10-15 DIAGNOSIS — K21.9 GASTROESOPHAGEAL REFLUX DISEASE WITHOUT ESOPHAGITIS: ICD-10-CM

## 2021-10-15 PROCEDURE — 74220 X-RAY XM ESOPHAGUS 1CNTRST: CPT

## 2021-10-18 ENCOUNTER — CONSULT (OUTPATIENT)
Dept: GASTROENTEROLOGY | Facility: CLINIC | Age: 31
End: 2021-10-18
Payer: COMMERCIAL

## 2021-10-18 VITALS
WEIGHT: 156.6 LBS | DIASTOLIC BLOOD PRESSURE: 70 MMHG | HEART RATE: 98 BPM | HEIGHT: 73 IN | SYSTOLIC BLOOD PRESSURE: 110 MMHG | BODY MASS INDEX: 20.75 KG/M2

## 2021-10-18 DIAGNOSIS — R13.10 DYSPHAGIA, UNSPECIFIED TYPE: ICD-10-CM

## 2021-10-18 DIAGNOSIS — R13.10 DYSPHAGIA, UNSPECIFIED TYPE: Primary | ICD-10-CM

## 2021-10-18 DIAGNOSIS — K21.9 GASTROESOPHAGEAL REFLUX DISEASE WITHOUT ESOPHAGITIS: ICD-10-CM

## 2021-10-18 PROCEDURE — 99243 OFF/OP CNSLTJ NEW/EST LOW 30: CPT | Performed by: INTERNAL MEDICINE

## 2021-10-18 PROCEDURE — 3008F BODY MASS INDEX DOCD: CPT | Performed by: INTERNAL MEDICINE

## 2021-10-18 PROCEDURE — 1036F TOBACCO NON-USER: CPT | Performed by: INTERNAL MEDICINE

## 2021-10-18 RX ORDER — PANTOPRAZOLE SODIUM 40 MG/1
40 TABLET, DELAYED RELEASE ORAL 2 TIMES DAILY
Qty: 60 TABLET | Refills: 1 | Status: SHIPPED | OUTPATIENT
Start: 2021-10-18 | End: 2021-11-29 | Stop reason: SDUPTHER

## 2021-10-27 ENCOUNTER — TELEPHONE (OUTPATIENT)
Dept: GASTROENTEROLOGY | Facility: CLINIC | Age: 31
End: 2021-10-27

## 2021-11-04 ENCOUNTER — TELEPHONE (OUTPATIENT)
Dept: GASTROENTEROLOGY | Facility: CLINIC | Age: 31
End: 2021-11-04

## 2021-11-04 ENCOUNTER — OFFICE VISIT (OUTPATIENT)
Dept: FAMILY MEDICINE CLINIC | Facility: CLINIC | Age: 31
End: 2021-11-04
Payer: COMMERCIAL

## 2021-11-04 VITALS
RESPIRATION RATE: 18 BRPM | HEIGHT: 73 IN | BODY MASS INDEX: 20.81 KG/M2 | WEIGHT: 157 LBS | HEART RATE: 80 BPM | OXYGEN SATURATION: 100 % | SYSTOLIC BLOOD PRESSURE: 100 MMHG | DIASTOLIC BLOOD PRESSURE: 54 MMHG

## 2021-11-04 DIAGNOSIS — D72.819 LEUKOPENIA, UNSPECIFIED TYPE: ICD-10-CM

## 2021-11-04 DIAGNOSIS — Z23 NEED FOR INFLUENZA VACCINATION: ICD-10-CM

## 2021-11-04 DIAGNOSIS — L30.9 DERMATITIS: ICD-10-CM

## 2021-11-04 DIAGNOSIS — Z00.00 ENCOUNTER FOR ANNUAL PHYSICAL EXAM: Primary | ICD-10-CM

## 2021-11-04 PROCEDURE — 90686 IIV4 VACC NO PRSV 0.5 ML IM: CPT

## 2021-11-04 PROCEDURE — 3725F SCREEN DEPRESSION PERFORMED: CPT | Performed by: FAMILY MEDICINE

## 2021-11-04 PROCEDURE — 99395 PREV VISIT EST AGE 18-39: CPT | Performed by: FAMILY MEDICINE

## 2021-11-04 PROCEDURE — 90471 IMMUNIZATION ADMIN: CPT

## 2021-11-04 PROCEDURE — 3008F BODY MASS INDEX DOCD: CPT | Performed by: FAMILY MEDICINE

## 2021-11-04 PROCEDURE — 1036F TOBACCO NON-USER: CPT | Performed by: FAMILY MEDICINE

## 2021-11-04 RX ORDER — TRIAMCINOLONE ACETONIDE 1 MG/G
CREAM TOPICAL 2 TIMES DAILY
Qty: 15 G | Refills: 1 | Status: SHIPPED | OUTPATIENT
Start: 2021-11-04 | End: 2021-11-14

## 2021-11-08 ENCOUNTER — TELEPHONE (OUTPATIENT)
Dept: GASTROENTEROLOGY | Facility: CLINIC | Age: 31
End: 2021-11-08

## 2021-11-08 DIAGNOSIS — K21.9 GASTROESOPHAGEAL REFLUX DISEASE, UNSPECIFIED WHETHER ESOPHAGITIS PRESENT: Primary | ICD-10-CM

## 2021-11-08 RX ORDER — OMEPRAZOLE 20 MG/1
20 CAPSULE, DELAYED RELEASE ORAL DAILY
Qty: 60 CAPSULE | Refills: 3 | Status: SHIPPED | OUTPATIENT
Start: 2021-11-08 | End: 2021-11-19

## 2021-11-19 RX ORDER — LANSOPRAZOLE 30 MG/1
30 TABLET, ORALLY DISINTEGRATING, DELAYED RELEASE ORAL 2 TIMES DAILY
Qty: 60 TABLET | Refills: 2 | Status: SHIPPED | OUTPATIENT
Start: 2021-11-19 | End: 2022-07-27

## 2021-11-29 DIAGNOSIS — K21.9 GASTROESOPHAGEAL REFLUX DISEASE WITHOUT ESOPHAGITIS: ICD-10-CM

## 2021-11-29 RX ORDER — PANTOPRAZOLE SODIUM 40 MG/1
40 TABLET, DELAYED RELEASE ORAL 2 TIMES DAILY
Qty: 60 TABLET | Refills: 0 | Status: SHIPPED | OUTPATIENT
Start: 2021-11-29 | End: 2021-12-12

## 2021-11-30 ENCOUNTER — TELEPHONE (OUTPATIENT)
Dept: GASTROENTEROLOGY | Facility: CLINIC | Age: 31
End: 2021-11-30

## 2021-12-12 DIAGNOSIS — K21.9 GASTROESOPHAGEAL REFLUX DISEASE WITHOUT ESOPHAGITIS: ICD-10-CM

## 2021-12-12 RX ORDER — PANTOPRAZOLE SODIUM 40 MG/1
TABLET, DELAYED RELEASE ORAL
Qty: 30 TABLET | Refills: 1 | Status: SHIPPED | OUTPATIENT
Start: 2021-12-12 | End: 2022-01-06

## 2021-12-22 ENCOUNTER — TELEPHONE (OUTPATIENT)
Dept: GASTROENTEROLOGY | Facility: AMBULARY SURGERY CENTER | Age: 31
End: 2021-12-22

## 2021-12-23 ENCOUNTER — ANESTHESIA EVENT (OUTPATIENT)
Dept: GASTROENTEROLOGY | Facility: AMBULARY SURGERY CENTER | Age: 31
End: 2021-12-23

## 2021-12-23 ENCOUNTER — ANESTHESIA (OUTPATIENT)
Dept: GASTROENTEROLOGY | Facility: AMBULARY SURGERY CENTER | Age: 31
End: 2021-12-23

## 2021-12-23 ENCOUNTER — HOSPITAL ENCOUNTER (OUTPATIENT)
Dept: GASTROENTEROLOGY | Facility: AMBULARY SURGERY CENTER | Age: 31
Setting detail: OUTPATIENT SURGERY
Discharge: HOME | End: 2021-12-23
Attending: INTERNAL MEDICINE | Admitting: INTERNAL MEDICINE
Payer: COMMERCIAL

## 2021-12-23 VITALS
BODY MASS INDEX: 20.59 KG/M2 | HEART RATE: 71 BPM | OXYGEN SATURATION: 98 % | RESPIRATION RATE: 22 BRPM | WEIGHT: 152 LBS | SYSTOLIC BLOOD PRESSURE: 116 MMHG | TEMPERATURE: 97.5 F | DIASTOLIC BLOOD PRESSURE: 64 MMHG | HEIGHT: 72 IN

## 2021-12-23 DIAGNOSIS — K21.9 GASTROESOPHAGEAL REFLUX DISEASE WITHOUT ESOPHAGITIS: ICD-10-CM

## 2021-12-23 PROCEDURE — 88305 TISSUE EXAM BY PATHOLOGIST: CPT | Performed by: PATHOLOGY

## 2021-12-23 PROCEDURE — 43239 EGD BIOPSY SINGLE/MULTIPLE: CPT | Performed by: INTERNAL MEDICINE

## 2021-12-23 RX ORDER — PROPOFOL 10 MG/ML
INJECTION, EMULSION INTRAVENOUS AS NEEDED
Status: DISCONTINUED | OUTPATIENT
Start: 2021-12-23 | End: 2021-12-23

## 2021-12-23 RX ORDER — LIDOCAINE HYDROCHLORIDE 10 MG/ML
INJECTION, SOLUTION EPIDURAL; INFILTRATION; INTRACAUDAL; PERINEURAL AS NEEDED
Status: DISCONTINUED | OUTPATIENT
Start: 2021-12-23 | End: 2021-12-23

## 2021-12-23 RX ORDER — SODIUM CHLORIDE, SODIUM LACTATE, POTASSIUM CHLORIDE, CALCIUM CHLORIDE 600; 310; 30; 20 MG/100ML; MG/100ML; MG/100ML; MG/100ML
INJECTION, SOLUTION INTRAVENOUS CONTINUOUS PRN
Status: DISCONTINUED | OUTPATIENT
Start: 2021-12-23 | End: 2021-12-23

## 2021-12-23 RX ADMIN — PROPOFOL 50 MG: 10 INJECTION, EMULSION INTRAVENOUS at 11:02

## 2021-12-23 RX ADMIN — PROPOFOL 50 MG: 10 INJECTION, EMULSION INTRAVENOUS at 11:00

## 2021-12-23 RX ADMIN — PROPOFOL 20 MG: 10 INJECTION, EMULSION INTRAVENOUS at 11:04

## 2021-12-23 RX ADMIN — SODIUM CHLORIDE, SODIUM LACTATE, POTASSIUM CHLORIDE, AND CALCIUM CHLORIDE: .6; .31; .03; .02 INJECTION, SOLUTION INTRAVENOUS at 10:48

## 2021-12-23 RX ADMIN — PROPOFOL 100 MG: 10 INJECTION, EMULSION INTRAVENOUS at 10:59

## 2021-12-23 RX ADMIN — LIDOCAINE HYDROCHLORIDE 50 MG: 10 INJECTION, SOLUTION EPIDURAL; INFILTRATION; INTRACAUDAL at 10:59

## 2021-12-23 RX ADMIN — PROPOFOL 20 MG: 10 INJECTION, EMULSION INTRAVENOUS at 11:03

## 2021-12-23 RX ADMIN — Medication 40 MG: at 11:05

## 2022-01-05 DIAGNOSIS — K21.9 GASTROESOPHAGEAL REFLUX DISEASE WITHOUT ESOPHAGITIS: ICD-10-CM

## 2022-01-06 RX ORDER — PANTOPRAZOLE SODIUM 40 MG/1
TABLET, DELAYED RELEASE ORAL
Qty: 60 TABLET | Refills: 0 | Status: SHIPPED | OUTPATIENT
Start: 2022-01-06 | End: 2022-02-07

## 2022-01-12 ENCOUNTER — OFFICE VISIT (OUTPATIENT)
Dept: GASTROENTEROLOGY | Facility: CLINIC | Age: 32
End: 2022-01-12
Payer: COMMERCIAL

## 2022-01-12 VITALS
SYSTOLIC BLOOD PRESSURE: 120 MMHG | HEIGHT: 72 IN | BODY MASS INDEX: 21.4 KG/M2 | WEIGHT: 158 LBS | DIASTOLIC BLOOD PRESSURE: 60 MMHG

## 2022-01-12 DIAGNOSIS — K21.9 GASTROESOPHAGEAL REFLUX DISEASE WITHOUT ESOPHAGITIS: Primary | ICD-10-CM

## 2022-01-12 DIAGNOSIS — K20.0 EOSINOPHILIC ESOPHAGITIS: ICD-10-CM

## 2022-01-12 DIAGNOSIS — R13.19 ESOPHAGEAL DYSPHAGIA: ICD-10-CM

## 2022-01-12 PROCEDURE — 99214 OFFICE O/P EST MOD 30 MIN: CPT | Performed by: INTERNAL MEDICINE

## 2022-01-12 PROCEDURE — 3008F BODY MASS INDEX DOCD: CPT | Performed by: INTERNAL MEDICINE

## 2022-01-12 RX ORDER — FLUTICASONE PROPIONATE 220 UG/1
4 AEROSOL, METERED RESPIRATORY (INHALATION) 2 TIMES DAILY
Qty: 36 G | Refills: 3 | Status: SHIPPED | OUTPATIENT
Start: 2022-01-12 | End: 2022-02-11

## 2022-01-12 NOTE — PROGRESS NOTES
[facility] - Outpatient Consultation  [facility] 27 y o  male MRN: [id number]  Encounter: [id number]          ASSESSMENT AND PLAN:    1  Esophageal dysphagia  Likely multifactorial but appears have evidence of eosinophilic esophagitis based on up to 15 eosinophils per high-power field in proximal esophagus, which was performed while patient was on b i d  PPI  The symptoms are currently mild, he still does have intermittent episodes of dysphagia  I discussed the possible treatment options including swallowed steroids or food allergen diet  He is agreeable to a trial of swallowed topical steroids, with the goal of eventually being able to wean off this in the future  I have prescribed fluticasone 880 b i d  With clear instructions on how to administer  He will return in roughly 3 months to assess symptoms, and will likely plan repeat upper endoscopy at that time to evaluate if eosinophils have been completely eliminated  2  Gastroesophageal reflux disease without esophagitis  Significant symptom relief of heartburn with the use of b i d  PPI  Given ongoing eosinophilia, will continue this for now with eventual plan to taper to daily pantoprazole         ______________________________________________________________________    HPI:  Mr Jameson Martinez is a 26 y/o male with history of asthma, allergic rhinitis presenting for follow up after EGD for dysphagia and GERD  He was initially seen for about 6 weeks duration of significant symptoms of heartburn as well as dysphagia  At time of upper endoscopy he had been on Protonix 40 mg b i d  For roughly 2 months  Endoscopically, upper endoscopy was unremarkable  On pathology he was noted to have 10 eosinophils per high-power field, and on proximal esophagus biopsies he had up to 15 eosinophils per high-power field  Despite being on PPI b i d , he does have eosinophils noted as well as persistent but now mild symptoms    The dysphagia complaints are mild and intermittent compared to prior to starting PPI  He has not had any episodes of impaction  There is no family history of esophageal disease such as Butt's esophagus, malignancy, or motility disorder  REVIEW OF SYSTEMS:    CONSTITUTIONAL: Denies any fever, chills, rigors, and weight loss  HEENT: Denies odynophagia, tinnitus  CARDIOVASCULAR: No chest pain or palpitations  RESPIRATORY: Denies any cough, hemoptysis, shortness of breath or dyspnea on exertion  GASTROINTESTINAL: As noted in the History of Present Illness  GENITOURINARY: No problems with urination  Denies any hematuria or dysuria  NEUROLOGIC: No dizziness or vertigo, denies headaches  MUSCULOSKELETAL: Denies any muscle or joint pain  SKIN: Denies skin rashes or itching  ENDOCRINE:  Denies intolerance to heat or cold  PSYCHOSOCIAL: Denies depression or anxiety  Denies any recent memory loss  Historical Information   Past Medical History:   Diagnosis Date    Asthma      Past Surgical History:   Procedure Laterality Date    WISDOM TOOTH EXTRACTION       Social History   Social History     Substance and Sexual Activity   Alcohol Use Not Currently     Social History     Substance and Sexual Activity   Drug Use Never     Social History     Tobacco Use   Smoking Status Never Smoker   Smokeless Tobacco Never Used     Family History   Problem Relation Age of Onset    Arthritis Mother     Hypertension Father     Stroke Maternal Grandfather        Meds/Allergies       Current Outpatient Medications:     albuterol (Ventolin HFA) 90 mcg/act inhaler    lansoprazole (PREVACID SOLUTAB) 30 mg disintegrating tablet    levocetirizine (XYZAL) 5 MG tablet    pantoprazole (PROTONIX) 40 mg tablet    triamcinolone (KENALOG) 0 1 % cream    No Known Allergies        Objective     There were no vitals taken for this visit  There is no height or weight on file to calculate BMI          PHYSICAL EXAM: General Appearance:   Alert, cooperative, no distress   HEENT:   Normocephalic, atraumatic, anicteric  Neck:  Supple, symmetrical, trachea midline   Lungs:   Clear to auscultation bilaterally; no rales, rhonchi or wheezing; respirations unlabored    Heart[de-identified]   Regular rate and rhythm; no murmur, rub, or gallop  Abdomen:   Soft, non-tender, non-distended; normal bowel sounds; no masses, no organomegaly    Genitalia:   Deferred    Rectal:   Deferred    Extremities:  No cyanosis, clubbing or edema    Pulses:  2+ and symmetric    Skin:  No jaundice, rashes, or lesions          Lab Results:   No visits with results within 1 Day(s) from this visit  Latest known visit with results is:   Hospital Outpatient Visit on 12/23/2021   Component Date Value    Case Report 12/23/2021                      Value:Surgical Pathology Report                         Case: [id number]                                   Authorizing Provider:  Dorothy Mark DO    Collected:           12/23/2021 1108              Ordering Location:     [facility]        Received:            12/23/2021 [address] [place] [address]                                                    Pathologist:           Ranjana Connor MD                                                                 Specimens:   A) - Esophagus, esophagus bx                                                                        B) - Esophagus, proximal esophagus bx                                                      Final Diagnosis 12/23/2021                      Value: This result contains rich text formatting which cannot be displayed here   Note 12/23/2021                      Value: This result contains rich text formatting which cannot be displayed here   Additional Information 12/23/2021                      Value: This result contains rich text formatting which cannot be displayed here     Julian Figueredo Description 12/23/2021 Value:This result contains rich text formatting which cannot be displayed here  Radiology Results:   EGD    Result Date: 12/23/2021  Narrative: Maria T Fong [address] [place], [place] [address] Road [address] [place] [address] [phone] DATE OF SERVICE: 12/23/21 PHYSICIAN(S): Travis Farias DO - Attending Physician INDICATION: Gastroesophageal reflux disease without esophagitis POST-OP DIAGNOSIS: See the impression below  PREPROCEDURE: Informed consent was obtained for the procedure, including sedation  Risks of perforation, hemorrhage, adverse drug reaction and aspiration were discussed  The patient was placed in the left lateral decubitus position  Patient was explained about the risks and benefits of the procedure  Risks including but not limited to bleeding, infection, and perforation were explained in detail  Also explained about less than 100% sensitivity with the exam and other alternatives  DETAILS OF PROCEDURE: Patient was taken to the procedure room where a time out was performed to confirm correct patient and correct procedure  The patient underwent monitored anesthesia care, which was administered by an anesthesia professional  The patient's blood pressure, heart rate, level of consciousness, respirations and oxygen were monitored throughout the procedure  The scope was advanced to the second part of the duodenum  Retroflexion was performed in the fundus  The patient experienced no blood loss  The procedure was not difficult  The patient tolerated the procedure well  There were no apparent complications   ANESTHESIA INFORMATION: ASA: II Anesthesia Type: IV Sedation with Anesthesia MEDICATIONS: simethicone (MYLICON) 40 mg in sterile water 250 mL 40 mg (Totals for administrations occurring from 1058 to 1111 on 12/23/21) FINDINGS: Lantry-colored mucosa in the GE junction; performed cold forceps biopsy to rule out Butt's esophagus The stomach appeared normal  The duodenum appeared normal  SPECIMENS: ID Type Source Tests Collected by Time Destination 1 : esophagus bx Tissue Esophagus TISSUE EXAM Soni Sauceda DO 12/23/2021 11:08 AM  2 : proximal esophagus bx Tissue Esophagus TISSUE EXAM Soni Sauceda,  12/23/2021 11:09 AM      Impression: Irregular Z- line, biopsied to rule out Butt's esophagus Normal stomach and duodenum RECOMMENDATION: Await pathology Follow up in GI clinic for ongoing complaints as needed  Sunitha Barnes DO

## 2022-01-12 NOTE — PATIENT INSTRUCTIONS
Allergic Esophagitis   AMBULATORY CARE:   Allergic esophagitis  is a condition that causes your esophagus to swell and narrow when your body reacts to allergens  An allergen is anything you are allergic to, such as certain foods, dust, or pollen  Common signs and symptoms:   · Trouble swallowing    · Throat pain during swallowing    · Food stuck in the esophagus    · Heartburn or chest pain    Call your local emergency number (911 in the [address]) if:   · Food is stuck in your throat  · You have chest pain  Seek care immediately if:   · You vomit blood  · Your bowel movements are black and sticky  · You feel weak or dizzy  Call your doctor if:   · You have a fever  · You have white patches on your tongue and inside your mouth  · You lose weight without trying  · It is hard to swallow or it hurts to swallow, even after treatment  · You have questions or concerns about your condition or care  Treatment:  Allergic esophagitis may not go away completely  Treatment may help relieve your symptoms  · Steroid medicine  may help decrease swelling in your esophagus  You will swallow this medicine so it coats your esophagus  · Stomach acid medicine  helps keep heartburn symptoms under control  · Dilatation  is a procedure used when the esophagus narrows from swelling  An endoscope is placed into your mouth and down your throat  Tools on the endoscope press against the tissues to widen your esophagus  Dilatation can improve your symptoms but will not stop allergic esophagitis from happening  Food changes: You may need to stop eating certain foods for a while to see if your symptoms improve  Start eating these foods again one at a time as directed  If certain foods cause your symptoms, do not eat them  Some common examples are dairy, nuts, eggs, and seafood  You may need to change what you eat to relieve your symptoms   You may need to see a dietitian to help you get the right amount of nutrients  Follow up with your doctor as directed: Your doctor may refer you to a stomach specialist, allergist, or dietitian  Write down your questions so you remember to ask them during your follow-up visits  © Copyright [employer] 2021 Information is for End User's use only and may not be sold, redistributed or otherwise used for commercial purposes  All illustrations and images included in CareNotes® are the copyrighted property of [employer]  or Shani Medeiros   The above information is an  only  It is not intended as medical advice for individual conditions or treatments  Talk to your doctor, nurse or pharmacist before following any medical regimen to see if it is safe and effective for you

## 2022-02-03 ENCOUNTER — APPOINTMENT (OUTPATIENT)
Dept: LAB | Facility: MEDICAL CENTER | Age: 32
End: 2022-02-03
Payer: COMMERCIAL

## 2022-02-03 DIAGNOSIS — D72.819 LEUKOPENIA, UNSPECIFIED TYPE: ICD-10-CM

## 2022-02-03 LAB
ERYTHROCYTE [DISTWIDTH] IN BLOOD BY AUTOMATED COUNT: 11.5 % (ref 11.6–15.1)
HCT VFR BLD AUTO: 44.1 % (ref 36.5–49.3)
HGB BLD-MCNC: 14.7 G/DL (ref 12–17)
MCH RBC QN AUTO: 32.5 PG (ref 26.8–34.3)
MCHC RBC AUTO-ENTMCNC: 33.3 G/DL (ref 31.4–37.4)
MCV RBC AUTO: 98 FL (ref 82–98)
PLATELET # BLD AUTO: 145 THOUSANDS/UL (ref 149–390)
PMV BLD AUTO: 11.8 FL (ref 8.9–12.7)
RBC # BLD AUTO: 4.52 MILLION/UL (ref 3.88–5.62)
WBC # BLD AUTO: 2.33 THOUSAND/UL (ref 4.31–10.16)

## 2022-02-03 PROCEDURE — 36415 COLL VENOUS BLD VENIPUNCTURE: CPT

## 2022-02-03 PROCEDURE — 85027 COMPLETE CBC AUTOMATED: CPT

## 2022-02-07 DIAGNOSIS — K21.9 GASTROESOPHAGEAL REFLUX DISEASE WITHOUT ESOPHAGITIS: ICD-10-CM

## 2022-02-07 RX ORDER — PANTOPRAZOLE SODIUM 40 MG/1
TABLET, DELAYED RELEASE ORAL
Qty: 60 TABLET | Refills: 0 | Status: SHIPPED | OUTPATIENT
Start: 2022-02-07 | End: 2022-03-08

## 2022-03-08 DIAGNOSIS — K21.9 GASTROESOPHAGEAL REFLUX DISEASE WITHOUT ESOPHAGITIS: ICD-10-CM

## 2022-03-08 RX ORDER — PANTOPRAZOLE SODIUM 40 MG/1
TABLET, DELAYED RELEASE ORAL
Qty: 60 TABLET | Refills: 0 | Status: SHIPPED | OUTPATIENT
Start: 2022-03-08 | End: 2022-04-04

## 2022-03-22 ENCOUNTER — OFFICE VISIT (OUTPATIENT)
Dept: GASTROENTEROLOGY | Facility: CLINIC | Age: 32
End: 2022-03-22
Payer: COMMERCIAL

## 2022-03-22 VITALS
OXYGEN SATURATION: 97 % | WEIGHT: 159 LBS | HEART RATE: 80 BPM | RESPIRATION RATE: 18 BRPM | HEIGHT: 72 IN | BODY MASS INDEX: 21.54 KG/M2 | TEMPERATURE: 97.7 F | DIASTOLIC BLOOD PRESSURE: 74 MMHG | SYSTOLIC BLOOD PRESSURE: 118 MMHG

## 2022-03-22 DIAGNOSIS — K20.0 EOSINOPHILIC ESOPHAGITIS: Primary | ICD-10-CM

## 2022-03-22 DIAGNOSIS — K21.9 GASTROESOPHAGEAL REFLUX DISEASE WITHOUT ESOPHAGITIS: ICD-10-CM

## 2022-03-22 PROCEDURE — 99214 OFFICE O/P EST MOD 30 MIN: CPT | Performed by: INTERNAL MEDICINE

## 2022-03-22 PROCEDURE — 3008F BODY MASS INDEX DOCD: CPT | Performed by: INTERNAL MEDICINE

## 2022-03-22 PROCEDURE — 1036F TOBACCO NON-USER: CPT | Performed by: INTERNAL MEDICINE

## 2022-03-23 NOTE — PROGRESS NOTES
[facility] - Outpatient Consultation  [facility] 27 y o  male MRN: [id number]  Encounter: [id number]          ASSESSMENT AND PLAN:      1  Eosinophilic esophagitis  Mild eosinophilic elevation, 15 eosinophils per high-power field on proximal biopsies  Swallowed fluticasone did not provide much improvement in his symptoms  Despite technically meet criteria for EOE, I do feel his symptoms are primarily more related to uncontrolled reflux symptoms  At this time I would recommend that he continue pantoprazole b i d  and we will add Pepcid HS  I have also recommended that he see a dietitian given his interest in considering elimination diet to recognize specific food allergies that may be contributing to symptoms   - Ambulatory Referral to Nutrition Services; Future    2  Gastroesophageal reflux disease without esophagitis  As above, continue pantoprazole b i d  and add Pepcid for now  - Ambulatory Referral to Nutrition Services; Future    ______________________________________________________________________    HPI: Mr Angelo Uribe is a 28 y/o male with history of asthma, allergic rhinitis presenting for follow up regarding dysphagia and GERD, as well as elevated eosinophils on esophageal biopsies  At time of last visit although EOE ease were only 15 on proximal biopsies, a trial of swallowed fluticasone was initiated with not much improvement in his symptoms  He continues to have excessive throat clearing and sensation of difficulty swallowing food bolus at times this  However his primary complaint is persistent heartburn and regurgitation despite pantoprazole b i d   This is not a daily symptom, typically occurring in the evenings a few times per week and unpredictable  Summary of HPI:    He was initially seen for about 6 weeks duration of significant symptoms of heartburn as well as dysphagia  At time of upper endoscopy he had been on Protonix 40 mg b i d   For roughly 2 months  Endoscopically, upper endoscopy was unremarkable  On pathology he was noted to have 10 eosinophils per high-power field, and on proximal esophagus biopsies he had up to 15 eosinophils per high-power field      Despite being on PPI b i d , he does have eosinophils noted as well as persistent but now mild symptoms  The dysphagia complaints are mild and intermittent compared to prior to starting PPI  He has not had any episodes of impaction      There is no family history of esophageal disease such as Butt's esophagus, malignancy, or motility disorder          REVIEW OF SYSTEMS:    CONSTITUTIONAL: Denies any fever, chills, rigors, and weight loss  HEENT: Denies odynophagia, tinnitus  CARDIOVASCULAR: No chest pain or palpitations  RESPIRATORY: Denies any cough, hemoptysis, shortness of breath or dyspnea on exertion  GASTROINTESTINAL: As noted in the History of Present Illness  GENITOURINARY: No problems with urination  Denies any hematuria or dysuria  NEUROLOGIC: No dizziness or vertigo, denies headaches  MUSCULOSKELETAL: Denies any muscle or joint pain  SKIN: Denies skin rashes or itching  ENDOCRINE:  Denies intolerance to heat or cold  PSYCHOSOCIAL: Denies depression or anxiety  Denies any recent memory loss         Historical Information   Past Medical History:   Diagnosis Date    Asthma      Past Surgical History:   Procedure Laterality Date    WISDOM TOOTH EXTRACTION       Social History   Social History     Substance and Sexual Activity   Alcohol Use Not Currently     Social History     Substance and Sexual Activity   Drug Use Never     Social History     Tobacco Use   Smoking Status Never Smoker   Smokeless Tobacco Never Used     Family History   Problem Relation Age of Onset    Arthritis Mother     Hypertension Father     Stroke Maternal Grandfather        Meds/Allergies       Current Outpatient Medications:     albuterol (Ventolin HFA) 90 mcg/act inhaler    lansoprazole (PREVACID SOLUTAB) 30 mg disintegrating tablet    levocetirizine (XYZAL) 5 MG tablet    pantoprazole (PROTONIX) 40 mg tablet    fluticasone (Flovent HFA) 220 mcg/act inhaler    triamcinolone (KENALOG) 0 1 % cream    No Known Allergies        Objective     Blood pressure 118/74, pulse 80, temperature 97 7 °F (36 5 °C), temperature source Tympanic, resp  rate 18, height 6' (1 829 m), weight 72 1 kg (159 lb), SpO2 97 %  Body mass index is 21 56 kg/m²  PHYSICAL EXAM:      General Appearance:   Alert, cooperative, no distress   HEENT:   Normocephalic, atraumatic, anicteric  Neck:  Supple, symmetrical, trachea midline   Lungs:   Clear to auscultation bilaterally; no rales, rhonchi or wheezing; respirations unlabored    Heart[de-identified]   Regular rate and rhythm; no murmur, rub, or gallop  Abdomen:   Soft, non-tender, non-distended; normal bowel sounds; no masses, no organomegaly    Genitalia:   Deferred    Rectal:   Deferred    Extremities:  No cyanosis, clubbing or edema    Pulses:  2+ and symmetric    Skin:  No jaundice, rashes, or lesions          Lab Results:   No visits with results within 1 Day(s) from this visit  Latest known visit with results is:   Appointment on 02/03/2022   Component Date Value    WBC 02/03/2022 2 33*    RBC 02/03/2022 4 52     Hemoglobin 02/03/2022 14 7     Hematocrit 02/03/2022 44 1     MCV 02/03/2022 98     MCH 02/03/2022 32 5     MCHC 02/03/2022 33 3     RDW 02/03/2022 11 5*    Platelets 25/48/1530 145*    MPV 02/03/2022 11 8          Radiology Results:   No results found

## 2022-04-04 DIAGNOSIS — K21.9 GASTROESOPHAGEAL REFLUX DISEASE WITHOUT ESOPHAGITIS: ICD-10-CM

## 2022-04-04 RX ORDER — PANTOPRAZOLE SODIUM 40 MG/1
TABLET, DELAYED RELEASE ORAL
Qty: 60 TABLET | Refills: 0 | Status: SHIPPED | OUTPATIENT
Start: 2022-04-04 | End: 2022-05-02

## 2022-05-02 DIAGNOSIS — K21.9 GASTROESOPHAGEAL REFLUX DISEASE WITHOUT ESOPHAGITIS: ICD-10-CM

## 2022-05-02 RX ORDER — PANTOPRAZOLE SODIUM 40 MG/1
TABLET, DELAYED RELEASE ORAL
Qty: 60 TABLET | Refills: 0 | Status: SHIPPED | OUTPATIENT
Start: 2022-05-02 | End: 2022-06-01

## 2022-06-01 DIAGNOSIS — K21.9 GASTROESOPHAGEAL REFLUX DISEASE WITHOUT ESOPHAGITIS: ICD-10-CM

## 2022-06-01 RX ORDER — PANTOPRAZOLE SODIUM 40 MG/1
TABLET, DELAYED RELEASE ORAL
Qty: 60 TABLET | Refills: 0 | Status: SHIPPED | OUTPATIENT
Start: 2022-06-01 | End: 2022-06-29

## 2022-06-29 DIAGNOSIS — K21.9 GASTROESOPHAGEAL REFLUX DISEASE WITHOUT ESOPHAGITIS: ICD-10-CM

## 2022-06-29 RX ORDER — PANTOPRAZOLE SODIUM 40 MG/1
TABLET, DELAYED RELEASE ORAL
Qty: 60 TABLET | Refills: 0 | Status: SHIPPED | OUTPATIENT
Start: 2022-06-29 | End: 2022-07-30

## 2022-07-27 ENCOUNTER — OFFICE VISIT (OUTPATIENT)
Dept: GASTROENTEROLOGY | Facility: CLINIC | Age: 32
End: 2022-07-27
Payer: COMMERCIAL

## 2022-07-27 VITALS
WEIGHT: 154 LBS | HEIGHT: 72 IN | BODY MASS INDEX: 20.86 KG/M2 | TEMPERATURE: 97.1 F | RESPIRATION RATE: 18 BRPM | HEART RATE: 81 BPM | DIASTOLIC BLOOD PRESSURE: 68 MMHG | SYSTOLIC BLOOD PRESSURE: 120 MMHG | OXYGEN SATURATION: 98 %

## 2022-07-27 DIAGNOSIS — K21.9 GASTROESOPHAGEAL REFLUX DISEASE, UNSPECIFIED WHETHER ESOPHAGITIS PRESENT: Primary | ICD-10-CM

## 2022-07-27 DIAGNOSIS — K20.0 EOSINOPHILIC ESOPHAGITIS: ICD-10-CM

## 2022-07-27 PROCEDURE — 99214 OFFICE O/P EST MOD 30 MIN: CPT | Performed by: INTERNAL MEDICINE

## 2022-07-27 RX ORDER — DEXLANSOPRAZOLE 30 MG/1
30 CAPSULE, DELAYED RELEASE ORAL 2 TIMES DAILY
Qty: 60 CAPSULE | Refills: 1 | Status: SHIPPED | OUTPATIENT
Start: 2022-07-27 | End: 2022-10-20

## 2022-07-27 NOTE — PROGRESS NOTES
[facility] - Outpatient Consultation  [facility] 34 y o  male MRN: [id number]  Encounter: [id number]          ASSESSMENT AND PLAN:      1  Gastroesophageal reflux disease, unspecified whether esophagitis present  - dexlansoprazole (DEXILANT) 30 MG capsule; Take 1 capsule (30 mg total) by mouth 2 (two) times a day  Dispense: 60 capsule; Refill: 1  2  Eosinophilic esophagitis    [id number] male presenting for follow-up regarding EOE and GERD  I do feel his symptoms are primarily driven by a uncontrolled GERD, although recognizing 15 eosinophils per high-power field, there is still likely a component of EOE that is not completely controlled currently  Unfortunately he had no response to fluticasone in the past   He has also done an extensive trial of elimination diet for EOE with no significant improvement  At this time we will change PPI to Dexilant if this can be improved, and if not covered, would then consider AcipHex  He has tried omeprazole in the past although I think this was at daily dosing without resolution of his symptoms  I have also encouraged him to take HS Pepcid  If no improvement despite switching PPI, would then consider addition of budesonide  If still no symptomatic improvement but then recommend esophageal manometry and 24 hour pH impedance to further evaluate his symptoms to see if there is a concomitant cause resulting in upper throat tightness and uncontrolled GERD symptoms  ______________________________________________________________________    HPI:  Mr Mylene Gomes is a 33 y/o male with history of asthma, allergic rhinitis presenting for follow up regarding EOE and GERD      At time of last visit although EOEs were only 15 on proximal biopsies, a trial of swallowed fluticasone was initiated with no improvement in his symptoms  He continues to have excessive throat clearing and sensation of difficulty swallowing food bolus at times this    However his primary complaint is persistent heartburn and regurgitation despite pantoprazole b i d  Madelin Kirby symptoms seems to be worse in the evening despite avoiding meals several hours prior to bedtime      Summary of HPI:    Parker Rogers initially seen for about 6 weeks duration of significant symptoms of heartburn as well as dysphagia   At time of upper endoscopy he had been on Protonix 40 mg b i d  For roughly 2 months    Endoscopically, upper endoscopy was unremarkable   On pathology he was noted to have 10 eosinophils per high-power field, and on proximal esophagus biopsies he had up to 15 eosinophils per high-power field      Despite being on PPI b i d , he does have eosinophils noted as well as persistent but now mild symptoms   The dysphagia complaints are mild and intermittent compared to prior to starting PPI  [facility] has not had any episodes of impaction      There is no family history of esophageal disease such as Butt's esophagus, malignancy, or motility disorder  REVIEW OF SYSTEMS:    CONSTITUTIONAL: Denies any fever, chills, rigors, and weight loss  HEENT: Denies odynophagia, tinnitus  CARDIOVASCULAR: No chest pain or palpitations  RESPIRATORY: Denies any cough, hemoptysis, shortness of breath or dyspnea on exertion  GASTROINTESTINAL: As noted in the History of Present Illness  GENITOURINARY: No problems with urination  Denies any hematuria or dysuria  NEUROLOGIC: No dizziness or vertigo, denies headaches  MUSCULOSKELETAL: Denies any muscle or joint pain  SKIN: Denies skin rashes or itching  ENDOCRINE:  Denies intolerance to heat or cold  PSYCHOSOCIAL: Denies depression or anxiety  Denies any recent memory loss         Historical Information   Past Medical History:   Diagnosis Date    Asthma      Past Surgical History:   Procedure Laterality Date    WISDOM TOOTH EXTRACTION       Social History   Social History     Substance and Sexual Activity   Alcohol Use Not Currently     Social History Substance and Sexual Activity   Drug Use Never     Social History     Tobacco Use   Smoking Status Never Smoker   Smokeless Tobacco Never Used     Family History   Problem Relation Age of Onset    Arthritis Mother     Hypertension Father     Stroke Maternal Grandfather        Meds/Allergies       Current Outpatient Medications:     albuterol (Ventolin HFA) 90 mcg/act inhaler    dexlansoprazole (DEXILANT) 30 MG capsule    levocetirizine (XYZAL) 5 MG tablet    pantoprazole (PROTONIX) 40 mg tablet    fluticasone (Flovent HFA) 220 mcg/act inhaler    triamcinolone (KENALOG) 0 1 % cream    No Known Allergies        Objective     Blood pressure 120/68, pulse 81, temperature (!) 97 1 °F (36 2 °C), temperature source Tympanic, resp  rate 18, height 6' (1 829 m), weight 69 9 kg (154 lb), SpO2 98 %  Body mass index is 20 89 kg/m²  PHYSICAL EXAM:      General Appearance:   Alert, cooperative, no distress   HEENT:   Normocephalic, atraumatic, anicteric  Neck:  Supple, symmetrical, trachea midline   Lungs:   Clear to auscultation bilaterally; no rales, rhonchi or wheezing; respirations unlabored    Heart[de-identified]   Regular rate and rhythm; no murmur, rub, or gallop  Abdomen:   Soft, non-tender, non-distended; normal bowel sounds; no masses, no organomegaly    Genitalia:   Deferred    Rectal:   Deferred    Extremities:  No cyanosis, clubbing or edema    Pulses:  2+ and symmetric    Skin:  No jaundice, rashes, or lesions          Lab Results:   No visits with results within 1 Day(s) from this visit  Latest known visit with results is:   Appointment on 02/03/2022   Component Date Value    WBC 02/03/2022 2 33 (A)    RBC 02/03/2022 4 52     Hemoglobin 02/03/2022 14 7     Hematocrit 02/03/2022 44 1     MCV 02/03/2022 98     MCH 02/03/2022 32 5     MCHC 02/03/2022 33 3     RDW 02/03/2022 11 5 (A)    Platelets 57/27/2669 145 (A)    MPV 02/03/2022 11 8          Radiology Results:   No results found

## 2022-07-30 DIAGNOSIS — K21.9 GASTROESOPHAGEAL REFLUX DISEASE WITHOUT ESOPHAGITIS: ICD-10-CM

## 2022-07-30 RX ORDER — PANTOPRAZOLE SODIUM 40 MG/1
TABLET, DELAYED RELEASE ORAL
Qty: 60 TABLET | Refills: 0 | Status: SHIPPED | OUTPATIENT
Start: 2022-07-30 | End: 2022-08-23 | Stop reason: SDUPTHER

## 2022-08-03 ENCOUNTER — APPOINTMENT (OUTPATIENT)
Dept: LAB | Facility: MEDICAL CENTER | Age: 32
End: 2022-08-03
Payer: COMMERCIAL

## 2022-08-03 DIAGNOSIS — D72.819 LEUKOPENIA, UNSPECIFIED TYPE: ICD-10-CM

## 2022-08-03 LAB
ERYTHROCYTE [DISTWIDTH] IN BLOOD BY AUTOMATED COUNT: 12.2 % (ref 11.6–15.1)
HCT VFR BLD AUTO: 40.9 % (ref 36.5–49.3)
HGB BLD-MCNC: 13.4 G/DL (ref 12–17)
MCH RBC QN AUTO: 32.2 PG (ref 26.8–34.3)
MCHC RBC AUTO-ENTMCNC: 32.8 G/DL (ref 31.4–37.4)
MCV RBC AUTO: 98 FL (ref 82–98)
PLATELET # BLD AUTO: 164 THOUSANDS/UL (ref 149–390)
PMV BLD AUTO: 12.9 FL (ref 8.9–12.7)
RBC # BLD AUTO: 4.16 MILLION/UL (ref 3.88–5.62)
WBC # BLD AUTO: 3.67 THOUSAND/UL (ref 4.31–10.16)

## 2022-08-03 PROCEDURE — 85027 COMPLETE CBC AUTOMATED: CPT

## 2022-08-03 PROCEDURE — 36415 COLL VENOUS BLD VENIPUNCTURE: CPT

## 2022-08-23 DIAGNOSIS — K21.9 GASTROESOPHAGEAL REFLUX DISEASE WITHOUT ESOPHAGITIS: ICD-10-CM

## 2022-08-23 RX ORDER — PANTOPRAZOLE SODIUM 40 MG/1
40 TABLET, DELAYED RELEASE ORAL 2 TIMES DAILY
Qty: 60 TABLET | Refills: 0 | Status: SHIPPED | OUTPATIENT
Start: 2022-08-23 | End: 2022-09-19 | Stop reason: SDUPTHER

## 2022-08-30 ENCOUNTER — TELEPHONE (OUTPATIENT)
Dept: GASTROENTEROLOGY | Facility: CLINIC | Age: 32
End: 2022-08-30

## 2022-08-30 NOTE — TELEPHONE ENCOUNTER
Called pt to let them know that we are going to have to cancel their appt and let them know that they have to call us back to reschedule a new appt

## 2022-09-08 ENCOUNTER — OFFICE VISIT (OUTPATIENT)
Dept: FAMILY MEDICINE CLINIC | Facility: CLINIC | Age: 32
End: 2022-09-08
Payer: COMMERCIAL

## 2022-09-08 VITALS
OXYGEN SATURATION: 98 % | DIASTOLIC BLOOD PRESSURE: 70 MMHG | WEIGHT: 152.4 LBS | SYSTOLIC BLOOD PRESSURE: 124 MMHG | BODY MASS INDEX: 20.64 KG/M2 | RESPIRATION RATE: 12 BRPM | HEIGHT: 72 IN | HEART RATE: 77 BPM

## 2022-09-08 DIAGNOSIS — R21 RASH: Primary | ICD-10-CM

## 2022-09-08 PROCEDURE — 3725F SCREEN DEPRESSION PERFORMED: CPT | Performed by: INTERNAL MEDICINE

## 2022-09-08 PROCEDURE — 99213 OFFICE O/P EST LOW 20 MIN: CPT | Performed by: INTERNAL MEDICINE

## 2022-09-08 RX ORDER — KETOCONAZOLE 20 MG/G
CREAM TOPICAL 2 TIMES DAILY
Qty: 15 G | Refills: 0 | Status: SHIPPED | OUTPATIENT
Start: 2022-09-08

## 2022-09-08 NOTE — PROGRESS NOTES
Assessment/Plan:    Rash  Macular papular itchy rash under his eyes  Conjunctiva is normal   No discharge no vision changes  Came be allergic, fungal, bacterial in etiology  Will use hydrocortisone 2 5%, mupirocin and ketoconazole mixture twice a day  If no better in 7 days he will update me  Diagnoses and all orders for this visit:    Rash  -     hydrocortisone 2 5 % cream; Apply topically 2 (two) times a day  -     ketoconazole (NIZORAL) 2 % cream; Apply topically 2 (two) times a day  -     mupirocin (BACTROBAN) 2 % ointment; Apply topically 2 (two) times a day          Subjective:      Patient ID: Tres Keys is a 34 y o  male  Patient came today with complaints of persistent rash under his both eyes that started 3 weeks ago  The following portions of the patient's history were reviewed and updated as appropriate: allergies, current medications, past family history, past medical history, past social history, past surgical history, and problem list     Review of Systems   Skin: Positive for rash           Objective:      /70 (BP Location: Left arm, Patient Position: Sitting, Cuff Size: Standard)   Pulse 77   Resp 12   Ht 6' (1 829 m)   Wt 69 1 kg (152 lb 6 4 oz)   SpO2 98%   BMI 20 67 kg/m²     No Known Allergies       Current Outpatient Medications:     albuterol (Ventolin HFA) 90 mcg/act inhaler, Inhale 1-2 puffs every 6 (six) hours as needed for wheezing, Disp: 8 g, Rfl: 0    hydrocortisone 2 5 % cream, Apply topically 2 (two) times a day, Disp: 20 g, Rfl: 0    ketoconazole (NIZORAL) 2 % cream, Apply topically 2 (two) times a day, Disp: 15 g, Rfl: 0    levocetirizine (XYZAL) 5 MG tablet, Take 1 tablet (5 mg total) by mouth every evening ( for allergies ), Disp: 90 tablet, Rfl: 1    mupirocin (BACTROBAN) 2 % ointment, Apply topically 2 (two) times a day, Disp: 15 g, Rfl: 0    pantoprazole (PROTONIX) 40 mg tablet, Take 1 tablet (40 mg total) by mouth 2 (two) times a day, Disp: 60 tablet, Rfl: 0    dexlansoprazole (DEXILANT) 30 MG capsule, Take 1 capsule (30 mg total) by mouth 2 (two) times a day, Disp: 60 capsule, Rfl: 1    fluticasone (Flovent HFA) 220 mcg/act inhaler, Inhale 4 puffs 2 (two) times a day 4 puffs (twice a day) for treatment of eosinophilic esophagitis 1) shake inhaler to mix medicine 2)put mouthpiece to mouth and close lips around mouthpiece 3)hold breath, press the inhaler and SWALLOW (do not inhale) 4) Do not breathe in until medicine is swallowed 5)repeat above steps 4 times for total of 880mcg swallowed 6) Do not eat or drink anything for 30 minutes after  You may rinse mouth out but do not swallow, Disp: 36 g, Rfl: 3    triamcinolone (KENALOG) 0 1 % cream, Apply topically 2 (two) times a day for 10 days, Disp: 15 g, Rfl: 1     There are no Patient Instructions on file for this visit  Physical Exam  Skin:     Findings: Rash present

## 2022-09-08 NOTE — ASSESSMENT & PLAN NOTE
Macular papular itchy rash under his eyes  Conjunctiva is normal   No discharge no vision changes  Came be allergic, fungal, bacterial in etiology  Will use hydrocortisone 2 5%, mupirocin and ketoconazole mixture twice a day  If no better in 7 days he will update me

## 2022-09-19 DIAGNOSIS — K21.9 GASTROESOPHAGEAL REFLUX DISEASE WITHOUT ESOPHAGITIS: ICD-10-CM

## 2022-09-19 RX ORDER — PANTOPRAZOLE SODIUM 40 MG/1
40 TABLET, DELAYED RELEASE ORAL 2 TIMES DAILY
Qty: 60 TABLET | Refills: 0 | Status: SHIPPED | OUTPATIENT
Start: 2022-09-19 | End: 2022-10-20 | Stop reason: SDUPTHER

## 2022-09-19 NOTE — TELEPHONE ENCOUNTER
Medication Refill Request     Name pantoprazole (PROTONIX) 40 mg tablet  Dose/Frequency 2 x day  Quantity 60 tablets  Verified pharmacy   [x]  Verified ordering Provider   [x]  Does patient have enough for the next 3 days?  Yes [x] No []

## 2022-10-20 DIAGNOSIS — K21.9 GASTROESOPHAGEAL REFLUX DISEASE WITHOUT ESOPHAGITIS: ICD-10-CM

## 2022-10-20 RX ORDER — PANTOPRAZOLE SODIUM 40 MG/1
40 TABLET, DELAYED RELEASE ORAL 2 TIMES DAILY
Qty: 60 TABLET | Refills: 0 | Status: SHIPPED | OUTPATIENT
Start: 2022-10-20

## 2022-11-07 ENCOUNTER — OFFICE VISIT (OUTPATIENT)
Dept: FAMILY MEDICINE CLINIC | Facility: CLINIC | Age: 32
End: 2022-11-07

## 2022-11-07 VITALS
HEIGHT: 73 IN | WEIGHT: 156 LBS | DIASTOLIC BLOOD PRESSURE: 62 MMHG | HEART RATE: 70 BPM | BODY MASS INDEX: 20.67 KG/M2 | SYSTOLIC BLOOD PRESSURE: 108 MMHG | OXYGEN SATURATION: 99 % | TEMPERATURE: 98 F

## 2022-11-07 DIAGNOSIS — Z00.00 ENCOUNTER FOR ANNUAL PHYSICAL EXAM: Primary | ICD-10-CM

## 2022-11-07 DIAGNOSIS — Z23 NEED FOR INFLUENZA VACCINATION: ICD-10-CM

## 2022-11-07 NOTE — PATIENT INSTRUCTIONS
Reviewed health history along with medication, he did undergo swallow study which was normal, had undergone EGD which showed findings borderline for eosinophilic esophagitis  He will be seeing GI again in December  Did try Flovent for 2 months, noted no difference  Also has tried dietary changes without much difference  Is using pantoprazole 40 mg twice daily with some benefit  Does supplement with over-the-counter antacid at times  Has not required albuterol inhaler in over 1 year  See previous EKG, chest x-ray    We did review previous blood work, back in August CBC was stable, usually runs slightly low WBC, was 3 67  Last year BMP was fine, glucose 98  Consider redo blood work next year, CBC, CMP, TSH, consider lipid screen  He is up to date with Diabetes screening  Immunization History   Administered Date(s) Administered    COVID-19 PFIZER VACCINE 0 3 ML IM 03/02/2021, 03/23/2021    Influenza Quadrivalent Preservative Free 3 years and older IM 10/16/2014, 09/25/2017    Influenza Quadrivalent, 6-35 Months IM 10/07/2015, 10/03/2016    Influenza, injectable, quadrivalent, preservative free 0 5 mL 10/03/2019, 10/08/2020, 11/04/2021, 11/07/2022    Influenza, seasonal, injectable 10/03/2012, 10/09/2013    Tdap 10/27/2020       He does do yearly Flu shot  Given today  Tdap/tetanus shot is up to date  (done every 10 yrs for superficial cuts, every 5 yrs for deep wounds)  Covid vaccine rcvd x 3 -   had covid 1 month ago      Was never a smoker    Continue to try to watch healthy diet, exercise routinely  Does have occasional pain left metatarsal phalangeal joint 1/great toe, is watching proper footwear, mechanics, has been biking verses running  Avoid using steroid creams on face, can use ketoconazole cream as needed      Does have some drying of throat, notes occasional discharge medial canthus eye - can use Opcon or Naphcon A drops as needed, or even plain saline /wetting drops as needed -if issue continues consider seeing eye doctor    We will see him again in 12 months, sooner as needed

## 2022-11-07 NOTE — PROGRESS NOTES
BMI Counseling: Body mass index is 20 58 kg/m²  The BMI is above normal  Nutrition recommendations include encouraging healthy choices of fruits and vegetables  Exercise recommendations include exercising 3-5 times per week  Rationale for BMI follow-up plan is due to patient being overweight or obese  FAMILY PRACTICE OFFICE VISIT  [facility]  [address]  [address] [place], [place], [address]      NAME: Radha See  AGE: 34 y o  SEX: male  : 1990   MRN: [id number]    DATE: 2022  TIME: 12:43 PM    Assessment and Plan     Problem List Items Addressed This Visit    None     Visit Diagnoses     Encounter for annual physical exam    -  Primary    Need for influenza vaccination        Relevant Orders    influenza vaccine, quadrivalent, 0 5 mL, preservative-free, for adult and pediatric patients 6 mos+ (AFLURIA, FLUARIX, FLULAVAL, FLUZONE) (Completed)          Patient Instructions     Reviewed health history along with medication, he did undergo swallow study which was normal, had undergone EGD which showed findings borderline for eosinophilic esophagitis  He will be seeing GI again in December  Did try Flovent for 2 months, noted no difference  Also has tried dietary changes without much difference  Is using pantoprazole 40 mg twice daily with some benefit  Does supplement with over-the-counter antacid at times  Has not required albuterol inhaler in over 1 year  See previous EKG, chest x-ray    We did review previous blood work, back in August CBC was stable, usually runs slightly low WBC, was 3 67  Last year BMP was fine, glucose 98  Consider redo blood work next year, CBC, CMP, TSH, consider lipid screen  He is up to date with Diabetes screening      Immunization History   Administered Date(s) Administered   • COVID-19 PFIZER VACCINE 0 3 ML IM 2021, 2021   • Influenza Quadrivalent Preservative Free 3 years and older IM 10/16/2014, 09/25/2017   • Influenza Quadrivalent, 6-35 Months IM 10/07/2015, 10/03/2016   • Influenza, injectable, quadrivalent, preservative free 0 5 mL 10/03/2019, 10/08/2020, 11/04/2021, 11/07/2022   • Influenza, seasonal, injectable 10/03/2012, 10/09/2013   • Tdap 10/27/2020       He does do yearly Flu shot  Given today  Tdap/tetanus shot is up to date  (done every 10 yrs for superficial cuts, every 5 yrs for deep wounds)  Covid vaccine rcvd x 3 -   had covid 1 month ago      Was never a smoker    Continue to try to watch healthy diet, exercise routinely  Does have occasional pain left metatarsal phalangeal joint 1/great toe, is watching proper footwear, mechanics, has been biking verses running  Avoid using steroid creams on face, can use ketoconazole cream as needed  Does have some drying of throat, notes occasional discharge medial canthus eye - can use Opcon or Naphcon A drops as needed, or even plain saline /wetting drops as needed -if issue continues consider seeing eye doctor    We will see him again in 12 months, sooner as needed  Chief Complaint     Chief Complaint   Patient presents with   • Physical Exam       History of Present [address] is a 34y o -year-old male who is in for a routine checkup, he does bicycle for exercise, has cut back on his running, has had some pain left great toe  Does continue with intermittent chest tightness, acid sensation, has been seeing GI, underwent EGD with biopsy  Is using PPI twice daily with some benefit, occasionally uses antacids over-the-counter  Did try Flovent inhaler for 2 months, no change in symptoms  Also has tried dietary changes without benefit  Has not required albuterol in over 1 year      Review of Systems   Review of Systems   Constitutional: Negative for appetite change, fatigue, fever and unexpected weight change  HENT: Negative for sore throat           Does note some drying of throat at times   Eyes: Positive for discharge (Has noted medial discharge with eyes at times,)  Respiratory: Positive for chest tightness  Negative for cough and shortness of breath  Cardiovascular: Negative for chest pain, palpitations and leg swelling  Gastrointestinal: Negative for abdominal pain, blood in stool, nausea and vomiting  See HPI along with GI consult   Genitourinary: Negative for dysuria and hematuria  Skin:        Did have itching under eyes, used very short course of steroid cream, ketoconazole  Has noted some improvement, no rash under eyes, does have intermittent rash on chest, previously use triamcinolone with benefit but recurs   Neurological: Negative for dizziness, syncope, light-headedness and headaches  Hematological: Does not bruise/bleed easily  Psychiatric/Behavioral: Negative for behavioral problems and confusion  Active Problem List     Patient Active Problem List   Diagnosis   • Mild intermittent asthma without complication   • Allergic rhinitis   • Gastroesophageal reflux disease    • Globus sensation throat   • Leukocytopenia ( WBC 2 75 )   • Rash       Past Medical History:  Reviewed    Past Surgical History:  Reviewed    Family History:  Reviewed    Social History:  Reviewed    Objective     Vitals:    11/07/22 1133   BP: 108/62   BP Location: Left arm   Patient Position: Sitting   Cuff Size: Large   Pulse: 70   Temp: 98 °F (36 7 °C)   SpO2: 99%   Weight: 70 8 kg (156 lb)   Height: 6' 1" (1 854 m)     Body mass index is 20 58 kg/m²  BP Readings from Last 3 Encounters:   11/07/22 108/62   09/08/22 124/70   07/27/22 120/68       Wt Readings from Last 3 Encounters:   11/07/22 70 8 kg (156 lb)   09/08/22 69 1 kg (152 lb 6 4 oz)   07/27/22 69 9 kg (154 lb)       Physical Exam  Constitutional:       General: He is not in acute distress  Appearance: Normal appearance  He is well-developed  He is not ill-appearing     HENT:      Right Ear: Tympanic membrane normal       Left Ear: Tympanic membrane normal       Mouth/Throat:      Mouth: Mucous membranes are moist       Pharynx: Oropharynx is clear  Eyes:      General: No scleral icterus  Neck:      Vascular: No carotid bruit  Cardiovascular:      Rate and Rhythm: Normal rate and regular rhythm  Heart sounds: Normal heart sounds  No murmur heard  Pulmonary:      Effort: Pulmonary effort is normal  No respiratory distress  Breath sounds: Normal breath sounds  No wheezing, rhonchi or rales  Musculoskeletal:      Right lower leg: No edema  Left lower leg: No edema  Lymphadenopathy:      Cervical: No cervical adenopathy  Skin:     Coloration: Skin is not jaundiced  Neurological:      General: No focal deficit present  Mental Status: He is alert and oriented to person, place, and time  Psychiatric:         Mood and Affect: Mood normal          Behavior: Behavior normal          ALLERGIES:  No Known Allergies    Current Medications     Current Outpatient Medications   Medication Sig Dispense Refill   • pantoprazole (PROTONIX) 40 mg tablet Take 1 tablet (40 mg total) by mouth 2 (two) times a day 60 tablet 0   • albuterol (Ventolin HFA) 90 mcg/act inhaler Inhale 1-2 puffs every 6 (six) hours as needed for wheezing (Patient not taking: Reported on 11/7/2022) 8 g 0   • ketoconazole (NIZORAL) 2 % cream Apply topically 2 (two) times a day 15 g 0   • levocetirizine (XYZAL) 5 MG tablet Take 1 tablet (5 mg total) by mouth every evening ( for allergies ) 90 tablet 1     No current facility-administered medications for this visit              Orders Placed This Encounter   Procedures   • influenza vaccine, quadrivalent, 0 5 mL, preservative-free, for adult and pediatric patients 6 mos+ ([address] [facility]

## 2022-11-16 DIAGNOSIS — K21.9 GASTROESOPHAGEAL REFLUX DISEASE WITHOUT ESOPHAGITIS: ICD-10-CM

## 2022-11-16 RX ORDER — PANTOPRAZOLE SODIUM 40 MG/1
40 TABLET, DELAYED RELEASE ORAL 2 TIMES DAILY
Qty: 60 TABLET | Refills: 0 | Status: SHIPPED | OUTPATIENT
Start: 2022-11-16

## 2022-12-05 ENCOUNTER — OFFICE VISIT (OUTPATIENT)
Dept: GASTROENTEROLOGY | Facility: CLINIC | Age: 32
End: 2022-12-05

## 2022-12-05 VITALS
SYSTOLIC BLOOD PRESSURE: 110 MMHG | DIASTOLIC BLOOD PRESSURE: 58 MMHG | BODY MASS INDEX: 20.94 KG/M2 | HEIGHT: 73 IN | WEIGHT: 158 LBS | TEMPERATURE: 97.7 F

## 2022-12-05 DIAGNOSIS — K21.9 GASTROESOPHAGEAL REFLUX DISEASE WITHOUT ESOPHAGITIS: ICD-10-CM

## 2022-12-05 DIAGNOSIS — K20.0 EOSINOPHILIC ESOPHAGITIS: ICD-10-CM

## 2022-12-05 DIAGNOSIS — K21.9 GASTROESOPHAGEAL REFLUX DISEASE WITHOUT ESOPHAGITIS: Primary | ICD-10-CM

## 2022-12-05 RX ORDER — PANTOPRAZOLE SODIUM 40 MG/1
40 TABLET, DELAYED RELEASE ORAL 2 TIMES DAILY
Qty: 180 TABLET | Refills: 2 | Status: SHIPPED | OUTPATIENT
Start: 2022-12-05 | End: 2023-03-05

## 2022-12-05 NOTE — PATIENT INSTRUCTIONS
Scheduled date of EGD(as of today):04 10 23  Physician performing EGD:DR Doug Frederick  Location of EGD:[facility]  Instructions reviewed with patient by:OFFICE  Clearances:  N/A

## 2022-12-05 NOTE — PROGRESS NOTES
[facility] - Outpatient Consultation  [facility] 34 y o  male MRN: [id number]  Encounter: [id number]          ASSESSMENT AND PLAN:      1  Gastroesophageal reflux disease without esophagitis  - EGD; Future  - pantoprazole (PROTONIX) 40 mg tablet; Take 1 tablet (40 mg total) by mouth 2 (two) times a day  Dispense: 180 tablet; Refill: 2  2  Eosinophilic esophagitis  - EGD; Future  3  Gastroesophageal reflux disease   - EGD; Future  - pantoprazole (PROTONIX) 40 mg tablet; Take 1 tablet (40 mg total) by mouth 2 (two) times a day  Dispense: 180 tablet; Refill: 3 59-year-old male presenting for follow-up regarding EOE and GERD  I do feel his symptoms are primarily driven by a uncontrolled GERD, although recognizing 15 eosinophils per high-power field, there is still likely a component of EOE that is not completely controlled currently  We discussed that at this time we would like to repeat upper endoscopy with biopsies to evaluate if eosinophils are still present  This would be plan for likely March      In the meantime he will continue pantoprazole b i d  And use occasional antacids as needed in between if he has breakthrough symptoms    Unfortunately other PPIs have not been approved through his insurance including Dexilant   ______________________________________________________________________    HPI:  Mr Naldo Dixon is a 33 y/o male with history of asthma, allergic rhinitis presenting for follow up regarding EOE and GERD      Biopsies from his initial EGD demonstrated only 15 eosinophils, and he did not respond to a trial of swallowed fluticasone thus it was switch to PPI alone which he has been taking b i d  Over the past several months of symptoms have been better controlled, with only occasional time periods of breakthrough heartburn complaints that are responsive to antacids usually Tums      Summary of HPI:  Magdalena Da Silva initially seen for about 6 weeks duration of significant symptoms of heartburn as well as dysphagia   At time of upper endoscopy he had been on Protonix 40 mg b i d  For roughly 2 months    Endoscopically, upper endoscopy was unremarkable   On pathology he was noted to have 10 eosinophils per high-power field, and on proximal esophagus biopsies he had up to 15 eosinophils per high-power field      Despite being on PPI b i d , he does have eosinophils noted as well as persistent but now mild symptoms   The dysphagia complaints are mild and intermittent compared to prior to starting PPI  Octaviano Martinez has not had any episodes of impaction      There is no family history of esophageal disease such as Butt's esophagus, malignancy, or motility disorder  REVIEW OF SYSTEMS:    CONSTITUTIONAL: Denies any fever, chills, rigors, and weight loss  HEENT: Denies odynophagia, tinnitus  CARDIOVASCULAR: No chest pain or palpitations  RESPIRATORY: Denies any cough, hemoptysis, shortness of breath or dyspnea on exertion  GASTROINTESTINAL: As noted in the History of Present Illness  GENITOURINARY: No problems with urination  Denies any hematuria or dysuria  NEUROLOGIC: No dizziness or vertigo, denies headaches  MUSCULOSKELETAL: Denies any muscle or joint pain  SKIN: Denies skin rashes or itching  ENDOCRINE:  Denies intolerance to heat or cold  PSYCHOSOCIAL: Denies depression or anxiety  Denies any recent memory loss         Historical Information   Past Medical History:   Diagnosis Date   • Asthma    • GERD (gastroesophageal reflux disease)      Past Surgical History:   Procedure Laterality Date   • WISDOM TOOTH EXTRACTION       Social History   Social History     Substance and Sexual Activity   Alcohol Use Not Currently     Social History     Substance and Sexual Activity   Drug Use Never     Social History     Tobacco Use   Smoking Status Never   Smokeless Tobacco Never     Family History   Problem Relation Age of Onset   • Arthritis Mother    • Colon polyps Father    • Hypertension Father    • Stroke Maternal Grandfather        Meds/Allergies       Current Outpatient Medications:   •  albuterol (Ventolin HFA) 90 mcg/act inhaler  •  ketoconazole (NIZORAL) 2 % cream  •  levocetirizine (XYZAL) 5 MG tablet  •  pantoprazole (PROTONIX) 40 mg tablet    No Known Allergies        Objective     Blood pressure 110/58, temperature 97 7 °F (36 5 °C), temperature source Tympanic, height 6' 1" (1 854 m), weight 71 7 kg (158 lb)  Body mass index is 20 85 kg/m²  PHYSICAL EXAM:      General Appearance:   Alert, cooperative, no distress   HEENT:   Normocephalic, atraumatic, anicteric  Neck:  Supple, symmetrical, trachea midline   Lungs:   Clear to auscultation bilaterally; no rales, rhonchi or wheezing; respirations unlabored    Heart[de-identified]   Regular rate and rhythm; no murmur, rub, or gallop  Abdomen:   Soft, non-tender, non-distended; normal bowel sounds; no masses, no organomegaly    Genitalia:   Deferred    Rectal:   Deferred    Extremities:  No cyanosis, clubbing or edema    Pulses:  2+ and symmetric    Skin:  No jaundice, rashes, or lesions          Lab Results:   No visits with results within 1 Day(s) from this visit  Latest known visit with results is:   Appointment on 08/03/2022   Component Date Value   • WBC 08/03/2022 3 67 (L)    • RBC 08/03/2022 4 16    • Hemoglobin 08/03/2022 13 4    • Hematocrit 08/03/2022 40 9    • MCV 08/03/2022 98    • MCH 08/03/2022 32 2    • MCHC 08/03/2022 32 8    • RDW 08/03/2022 12 2    • Platelets 46/43/4954 164    • MPV 08/03/2022 12 9 (H)          Radiology Results:   No results found

## 2023-03-20 ENCOUNTER — TELEPHONE (OUTPATIENT)
Dept: GASTROENTEROLOGY | Facility: CLINIC | Age: 33
End: 2023-03-20

## 2023-05-16 ENCOUNTER — OFFICE VISIT (OUTPATIENT)
Dept: GASTROENTEROLOGY | Facility: CLINIC | Age: 33
End: 2023-05-16

## 2023-05-16 VITALS
DIASTOLIC BLOOD PRESSURE: 70 MMHG | BODY MASS INDEX: 21.74 KG/M2 | HEIGHT: 73 IN | TEMPERATURE: 97.2 F | WEIGHT: 164 LBS | SYSTOLIC BLOOD PRESSURE: 120 MMHG

## 2023-05-16 DIAGNOSIS — K20.0 EOSINOPHILIC ESOPHAGITIS: Primary | ICD-10-CM

## 2023-05-16 NOTE — PROGRESS NOTES
"[facility] - Outpatient Follow-up Note  [facility] CLARY 28 y o  male MRN: [id number]  Encounter: [id number]          ASSESSMENT AND PLAN:      Elsa Wilks is a 29 y/o male with EOE, GERD, allergic rhinitis who presents for follow-up    1  Eosinophilic esophagitis  2  GERD  Repeat EGD was WNL endoscopically with esophageal bx noting only 2 eosinophils pHPF in distal esophagus and 10 pHPF in proximal esophagus without any further EOE; gastric bx notable to active gastritis without H pylori  Pt denies any further dysphagia or globus sensation and notes his heartburn has decreased significantly, as well, but still gets \"sore throat\" every other day, however this lasts briefly  Pt is not interested in changing his meds at this time, but would like blood work drawn to rule out nutritional deficiencies as he has been on high dose PPI for at least 2 yrs  -continue protonix 40 mg BID per pt request: will let Dr Vito Avendano know  -iron, vit d, vit b12, folate, cbc ordered due to chronic PPI use  -anti-gerd diet reviewed  -follow-up in 2-3 months: if symptoms return/worsen, would recommend trying high dose omeprazole BID (as he only tried 20 mg in the past) VS nexium VS budesonide + repeat EGD    ______________________________________________________________________    SUBJECTIVE:  Elsa Wilks is a 29 y/o male with EOE, GERD, allergic rhinitis who presents for follow-up  Pt is quite pleased with his progress as he notes he no longer has any issues swallowing  He says he still gets heartburn and \"sore throat\" that lasts briefly, but says this is no longer constant  He says it usually occurs once every other day or so  He says he is hesitant to change his regimen at this time, so he would like to stay on the protonix BID  Pt denies n/v, abdominal pain, odynophagia, weight changes, fevers, chills, night sweats, constipation, diarrhea, bloody or black BMs, NSAID use         REVIEW OF SYSTEMS IS OTHERWISE " "NEGATIVE  Historical Information   Past Medical History:   Diagnosis Date   • Asthma    • GERD (gastroesophageal reflux disease)      Past Surgical History:   Procedure Laterality Date   • WISDOM TOOTH EXTRACTION       Social History   Social History     Substance and Sexual Activity   Alcohol Use Not Currently     Social History     Substance and Sexual Activity   Drug Use Never     Social History     Tobacco Use   Smoking Status Never   Smokeless Tobacco Never     Family History   Problem Relation Age of Onset   • Arthritis Mother    • Colon polyps Father    • Hypertension Father    • Stroke Maternal Grandfather        Meds/Allergies       Current Outpatient Medications:   •  albuterol (Ventolin HFA) 90 mcg/act inhaler  •  levocetirizine (XYZAL) 5 MG tablet  •  pantoprazole (PROTONIX) 40 mg tablet    No Known Allergies        Objective     Blood pressure 120/70, temperature (!) 97 2 °F (36 2 °C), temperature source Tympanic, height 6' 1\" (1 854 m), weight 74 4 kg (164 lb)  Body mass index is 21 64 kg/m²  PHYSICAL EXAM:      General Appearance:   Alert, cooperative, no distress   HEENT:   Normocephalic, atraumatic, anicteric      Neck:  Supple, symmetrical, trachea midline   Lungs:   Clear to auscultation bilaterally; no rales, rhonchi or wheezing; respirations unlabored    Heart[de-identified]   Regular rate and rhythm; no murmur, rub, or gallop  Abdomen:   Soft, non-tender, non-distended; normal bowel sounds; no masses, no organomegaly    Genitalia:   Deferred    Rectal:   Deferred    Extremities:  No cyanosis, clubbing or edema    Pulses:  2+ and symmetric    Skin:  No jaundice, rashes, or lesions    Lymph nodes:  No palpable cervical lymphadenopathy        Lab Results:   No visits with results within 1 Day(s) from this visit     Latest known visit with results is:   Hospital Outpatient Visit on 04/10/2023   Component Date Value   • Case Report 04/10/2023                      Value:Surgical Pathology Report        " Case: [id number]                                   Authorizing Provider:  Jennifer HainesinésDO    Collected:           04/10/2023 2246              Ordering Location:     [facility]        Received:            04/10/2023 [address] [facility]                                                    Pathologist:           Sidra Arias MD                                                                Specimens:   A) - Esophagus, Cold BX Distal esophagus                                                            B) - Esophagus, Cold BX Proximal esophagus                                                          C) - Stomach, Cold BX Gastric                                                             • Final Diagnosis 04/10/2023                      Value: This result contains rich text formatting which cannot be displayed here  • Additional Information 04/10/2023                      Value: This result contains rich text formatting which cannot be displayed here  • Gross Description 04/10/2023                      Value: This result contains rich text formatting which cannot be displayed here  Radiology Results:   No results found

## 2023-05-16 NOTE — Clinical Note
Hi! Just sending you this to let you know that this pt is not interested in changing his regimen  He still gets reflux every other day but no further dysphagia  Thanks!

## 2023-05-18 ENCOUNTER — APPOINTMENT (OUTPATIENT)
Dept: LAB | Facility: CLINIC | Age: 33
End: 2023-05-18

## 2023-05-18 DIAGNOSIS — K20.0 EOSINOPHILIC ESOPHAGITIS: ICD-10-CM

## 2023-05-18 LAB
25(OH)D3 SERPL-MCNC: 36.7 NG/ML (ref 30–100)
BASOPHILS # BLD AUTO: 0.01 THOUSANDS/ÂΜL (ref 0–0.1)
BASOPHILS NFR BLD AUTO: 0 % (ref 0–1)
EOSINOPHIL # BLD AUTO: 0.03 THOUSAND/ÂΜL (ref 0–0.61)
EOSINOPHIL NFR BLD AUTO: 1 % (ref 0–6)
ERYTHROCYTE [DISTWIDTH] IN BLOOD BY AUTOMATED COUNT: 11.9 % (ref 11.6–15.1)
FERRITIN SERPL-MCNC: 9 NG/ML (ref 24–336)
FOLATE SERPL-MCNC: 21.4 NG/ML
HCT VFR BLD AUTO: 41.4 % (ref 36.5–49.3)
HGB BLD-MCNC: 13.9 G/DL (ref 12–17)
IMM GRANULOCYTES # BLD AUTO: 0 THOUSAND/UL (ref 0–0.2)
IMM GRANULOCYTES NFR BLD AUTO: 0 % (ref 0–2)
IRON SATN MFR SERPL: 25 % (ref 20–50)
IRON SERPL-MCNC: 108 UG/DL (ref 65–175)
LYMPHOCYTES # BLD AUTO: 1.03 THOUSANDS/ÂΜL (ref 0.6–4.47)
LYMPHOCYTES NFR BLD AUTO: 44 % (ref 14–44)
MCH RBC QN AUTO: 32.3 PG (ref 26.8–34.3)
MCHC RBC AUTO-ENTMCNC: 33.6 G/DL (ref 31.4–37.4)
MCV RBC AUTO: 96 FL (ref 82–98)
MONOCYTES # BLD AUTO: 0.36 THOUSAND/ÂΜL (ref 0.17–1.22)
MONOCYTES NFR BLD AUTO: 15 % (ref 4–12)
NEUTROPHILS # BLD AUTO: 0.94 THOUSANDS/ÂΜL (ref 1.85–7.62)
NEUTS SEG NFR BLD AUTO: 40 % (ref 43–75)
NRBC BLD AUTO-RTO: 0 /100 WBCS
PLATELET # BLD AUTO: 149 THOUSANDS/UL (ref 149–390)
PMV BLD AUTO: 11.9 FL (ref 8.9–12.7)
RBC # BLD AUTO: 4.3 MILLION/UL (ref 3.88–5.62)
TIBC SERPL-MCNC: 433 UG/DL (ref 250–450)
VIT B12 SERPL-MCNC: 636 PG/ML (ref 180–914)
WBC # BLD AUTO: 2.37 THOUSAND/UL (ref 4.31–10.16)

## 2023-05-22 ENCOUNTER — TELEPHONE (OUTPATIENT)
Dept: OTHER | Facility: OTHER | Age: 33
End: 2023-05-22

## 2023-05-22 DIAGNOSIS — E61.1 IRON DEFICIENCY: ICD-10-CM

## 2023-05-22 DIAGNOSIS — D72.819 LEUKOPENIA, UNSPECIFIED TYPE: Primary | ICD-10-CM

## 2023-05-22 NOTE — TELEPHONE ENCOUNTER
Pt   saw his blood work on [address] and  He saw that his iron level is low  Is this something he should be concerned about? Pt  Is requesting a call back to discuss his results

## 2023-05-22 NOTE — TELEPHONE ENCOUNTER
Called patient and discussed lab results  Patient with normal folate, vitamin B12 and vitamin D levels  Interestingly, patient is iron deficient (ferritin 9) but not anemic (hgb 13 9)  Also noted to have chronic leukopenia  Denies overt GI bleeding or family history of colon cancer  As discussed with patient's primary GIproviders, recommended referral to hematology for evaluation of both leukopenia and iron deficiency  In the absence of family history of colon cancer or alarm features, no urgent need for colonoscopy unless requested by hematology  Orders placed  Patient gave verbal understanding and all questions were answered to his satisfaction

## 2023-05-31 ENCOUNTER — OFFICE VISIT (OUTPATIENT)
Dept: FAMILY MEDICINE CLINIC | Facility: CLINIC | Age: 33
End: 2023-05-31

## 2023-05-31 VITALS
OXYGEN SATURATION: 99 % | TEMPERATURE: 97.8 F | SYSTOLIC BLOOD PRESSURE: 128 MMHG | HEIGHT: 73 IN | RESPIRATION RATE: 12 BRPM | HEART RATE: 73 BPM | WEIGHT: 164.2 LBS | BODY MASS INDEX: 21.76 KG/M2 | DIASTOLIC BLOOD PRESSURE: 70 MMHG

## 2023-05-31 DIAGNOSIS — B36.9 FUNGAL RASH OF TRUNK: Primary | ICD-10-CM

## 2023-05-31 RX ORDER — NYSTATIN 100000 [USP'U]/G
POWDER TOPICAL 2 TIMES DAILY
Qty: 60 G | Refills: 1 | Status: SHIPPED | OUTPATIENT
Start: 2023-05-31

## 2023-05-31 NOTE — PROGRESS NOTES
"Assessment/Plan:    Fungal rash of trunk  Looks like intertrigo  He runs a lot  Discussed with him that he needs to keep area clean and dry  We will use nystatin powder  If no improvement he will let me know  Diagnoses and all orders for this visit:    Fungal rash of trunk  -     nystatin (MYCOSTATIN) powder; Apply topically 2 (two) times a day          Subjective:      Patient ID: Elenita Bray is a 28 y o  male  Patient came today with complaints of discomfort and redness in his inguinal folds  The following portions of the patient's history were reviewed and updated as appropriate: allergies, current medications, past family history, past medical history, past social history, past surgical history, and problem list     Review of Systems   Skin: Positive for rash  Objective:      /70 (BP Location: Right arm, Patient Position: Sitting, Cuff Size: Standard)   Pulse 73   Temp 97 8 °F (36 6 °C) (Temporal)   Resp 12   Ht 6' 1\" (1 854 m)   Wt 74 5 kg (164 lb 3 2 oz)   SpO2 99%   BMI 21 66 kg/m²     No Known Allergies       Current Outpatient Medications:   •  albuterol (Ventolin HFA) 90 mcg/act inhaler, Inhale 1-2 puffs every 6 (six) hours as needed for wheezing, Disp: 8 g, Rfl: 0  •  levocetirizine (XYZAL) 5 MG tablet, Take 1 tablet (5 mg total) by mouth every evening ( for allergies ), Disp: 90 tablet, Rfl: 1  •  nystatin (MYCOSTATIN) powder, Apply topically 2 (two) times a day, Disp: 60 g, Rfl: 1  •  pantoprazole (PROTONIX) 40 mg tablet, Take 1 tablet (40 mg total) by mouth 2 (two) times a day, Disp: 180 tablet, Rfl: 2     There are no Patient Instructions on file for this visit  Physical Exam  Skin:     Findings: Rash present           "

## 2023-06-22 ENCOUNTER — CONSULT (OUTPATIENT)
Dept: HEMATOLOGY ONCOLOGY | Facility: CLINIC | Age: 33
End: 2023-06-22
Payer: COMMERCIAL

## 2023-06-22 ENCOUNTER — TELEPHONE (OUTPATIENT)
Dept: SURGICAL ONCOLOGY | Facility: CLINIC | Age: 33
End: 2023-06-22

## 2023-06-22 VITALS
HEART RATE: 67 BPM | WEIGHT: 164 LBS | SYSTOLIC BLOOD PRESSURE: 126 MMHG | HEIGHT: 73 IN | OXYGEN SATURATION: 99 % | BODY MASS INDEX: 21.74 KG/M2 | TEMPERATURE: 97.8 F | RESPIRATION RATE: 18 BRPM | DIASTOLIC BLOOD PRESSURE: 68 MMHG

## 2023-06-22 DIAGNOSIS — K21.9 GASTROESOPHAGEAL REFLUX DISEASE WITHOUT ESOPHAGITIS: ICD-10-CM

## 2023-06-22 DIAGNOSIS — D72.819 LEUKOPENIA, UNSPECIFIED TYPE: Primary | ICD-10-CM

## 2023-06-22 DIAGNOSIS — E61.1 IRON DEFICIENCY: ICD-10-CM

## 2023-06-22 PROCEDURE — 99244 OFF/OP CNSLTJ NEW/EST MOD 40: CPT | Performed by: PHYSICIAN ASSISTANT

## 2023-06-22 RX ORDER — SODIUM CHLORIDE 9 MG/ML
20 INJECTION, SOLUTION INTRAVENOUS ONCE
OUTPATIENT
Start: 2023-07-06

## 2023-06-22 NOTE — PROGRESS NOTES
[address] [id number]  Hematology [address] [facility], 1990, [id number]  6/22/2023      Assessment and Plan   1  Leukopenia, unspecified type  This is a 79-year-old male with an 18-month history of leukopenia without previous laboratory assessment to compare  Most recent [address] just shy of 1000/unit liter  Patient is asymptomatic, no B symptoms, not requiring antibiotics  Interestingly the patient's mother had hepatitis as a young woman after receiving blood transfusions in the 80s  She eventually went on medications to correct this  Patient is unsure if the hepatitis was BRCA however, patient likely had this prior to becoming pregnant  Patient will undergo initial work-up  We discussed that if there is any abnormalities found, he will be referred for bone marrow biopsy or subsequent treatments depending upon results  This office will contact him directly if there is abnormalities in the blood work that require additional testing  Otherwise, we will observe in 3 months with blood work prior  I reviewed that typically iron deficiency does not significantly cause leukopenia however the patient will be treated for iron deficiency as outlined below  - Ambulatory Referral to Hematology / Oncology  - Comprehensive metabolic panel; Future  - CBC and differential; Future  - Iron Panel (Includes Ferritin, Iron Sat%, Iron, and TIBC); Future  - Occult Blood, Fecal Immunochemical; Future  - Reticulocytes; Future  - TSH, 3rd generation with Free T4 reflex; Future  - Methylmalonic acid, serum; Future  - Chronic Hepatitis Panel; Future  - Copper Level; Future  - HIV 1/2 AG/AB w Reflex [facility] for 2 yr old and above; Future  - Leukemia/Lymphoma flow cytometry;  Future  - sodium chloride 0 9 % infusion  - iron sucrose (VENOFER) 200 mg in sodium chloride 0 9 % 100 mL IVPB  - Iron Panel (Includes Ferritin, Iron Sat%, Iron, and TIBC); Future  - CBC and differential; Future    2  Iron deficiency; 3  Gastroesophageal reflux disease   Patient was found to have a ferritin of 9 with a borderline elevated TIBC in the 400s  Patient is mildly symptomatic  Etiology of iron deficiency likely secondary to decreased dietary intake however the patient does eat and pays attention to substrates since he does not eat red meat or pork  Patient sustains himself mainly on fish chicken and turkey  2 years ago patient started with GERD symptoms ended up taking Protonix twice a day over the past 2 years  Patient's GERD symptoms have improved and he has been recommended to move onto a nissin procedure  I question if iron deficiency is somewhat related to protonic usage, despite taking regular multivitamin  Patient has not had a colonoscopy however fecal occult blood testing has been recommended  IV iron was recommended: Venofer 200 mg IV weekly x 7  We discussed potential side effects which include but are not limited to IV site reactions, tattooing, hypotension, arthralgias, headache, nausea, and anaphylaxis  If patient experiences any side effects they are to call the office to discuss premedications are necessary for subsequent dosing     - Ambulatory Referral to Hematology / Oncology  - CBC and differential; Future  - Iron Panel (Includes Ferritin, Iron Sat%, Iron, and TIBC); Future  - Occult Blood, Fecal Immunochemical; Future  - Reticulocytes; Future  - sodium chloride 0 9 % infusion  - iron sucrose (VENOFER) 200 mg in sodium chloride 0 9 % 100 mL IVPB  - Iron Panel (Includes Ferritin, Iron Sat%, Iron, and TIBC); Future  - CBC and differential; Future    The patient is scheduled for follow-up in approximately 3 months  Patient voiced agreement and understanding to the above  Patient knows to call the Hematology/Oncology office with any questions and concerns regarding the above  Barrier(s) to care: None     The patient is  able to self care  Lynn Heck PA-C  Medical Oncology/Hematology  [address]    Subjective     Chief Complaint   Patient presents with   • Consult       Referring provider    Monika Casillas PA-C  [address],  [address]    History of present illness: This is a 48-year-old male with past medical history of seasonal allergies and GERD- secondary to lower esophageal sphincter dysfunction diagnosed in 2021 who presents to the hematology clinic for evaluation of iron deficiency anemia and leukopenia  Leukopenia:  · No symptoms of leukopenia or B symptoms  Patient does have some recurring fungal infections however he is an avid hiker and suffers from recurrent athlete's foot, occasionally will spread to the groin or to the trunk  · No social history contributory to viral etiology however, patient's mother required blood transfusions in the 80s  She subsequently was diagnosed with hepatitis and treated accordingly  No other details are known about this issue  Blood transfusion occurred prior to patient's birth  · Trends:  · 8/30/2021 WBC 2 96, hemoglobin 14 9, MCV 98, platelets 330, ANC 4 26  · 8/3/2022 WBC 3 67, hemoglobin 13 9, MCV 98, platelets 808, no differential  · 5/18/2023 B12 = 636, folate = 21 4, WBC 2 3, hemoglobin 13 9, MCV 96, platelets 078, ANC 4 56 differential, within normal limits    Iron deficiency anemia:  · Never known issue prior to recent blood testing which was completed through GI office  · Endoscopy without signs or symptoms of chronic blood loss anemia, no history of colonoscopy  · Patient restricts diet with limited beef and pork  However patient is conscious of this decision and takes a multivitamin regularly  · Patient has sleep disturbances, occasional fatigue  · Patient mountain bikes and hikes frequently    · Family history noncontributory/limited with no known extended family on mother side and paternal grandfather passing early but of unknown causes  · See trends above  Ferritin = 9, TIBC 433, iron saturation 25%    Review of Systems   Constitutional: Positive for activity change  Psychiatric/Behavioral: Positive for sleep disturbance  All other systems reviewed and are negative        Past Medical History:   Diagnosis Date   • Asthma    • GERD (gastroesophageal reflux disease)      Past Surgical History:   Procedure Laterality Date   • WISDOM TOOTH EXTRACTION       Family History   Problem Relation Age of Onset   • Arthritis Mother    • Colon polyps Father    • Hypertension Father    • Stroke Maternal Grandfather      Social History     Socioeconomic History   • Marital status: Single     Spouse name: None   • Number of children: None   • Years of education: None   • Highest education level: None   Occupational History   • Occupation: enemplyed   Tobacco Use   • Smoking status: Never     Passive exposure: Past (family members smoked over 10 years ago)   • Smokeless tobacco: Never   Vaping Use   • Vaping Use: Never used   Substance and Sexual Activity   • Alcohol use: Not Currently   • Drug use: Never   • Sexual activity: Not Currently   Other Topics Concern   • None   Social History Narrative   • None     Social Determinants of Health     Financial Resource Strain: Not on file   Food Insecurity: Not on file   Transportation Needs: Not on file   Physical Activity: Not on file   Stress: Not on file   Social Connections: Not on file   Intimate Partner Violence: Not on file   Housing Stability: Not on file         Current Outpatient Medications:   •  albuterol (Ventolin HFA) 90 mcg/act inhaler, Inhale 1-2 puffs every 6 (six) hours as needed for wheezing, Disp: 8 g, Rfl: 0  •  levocetirizine (XYZAL) 5 MG tablet, Take 1 tablet (5 mg total) by mouth every evening ( for allergies ), Disp: 90 tablet, Rfl: 1  •  nystatin (MYCOSTATIN) powder, Apply topically 2 (two) times a day, Disp: 60 g, Rfl: 1  • "pantoprazole (PROTONIX) 40 mg tablet, Take 1 tablet (40 mg total) by mouth 2 (two) times a day, Disp: 180 tablet, Rfl: 2  No Known Allergies    Objective   /68 (BP Location: Left arm, Patient Position: Sitting, Cuff Size: Adult)   Pulse 67   Temp 97 8 °F (36 6 °C) (Temporal)   Resp 18   Ht 6' 1\" (1 854 m)   Wt 74 4 kg (164 lb)   SpO2 99%   BMI 21 64 kg/m²   Physical Exam  Constitutional:       General: He is not in acute distress  Appearance: He is well-developed and normal weight  HENT:      Head: Normocephalic and atraumatic  Right Ear: External ear normal       Left Ear: External ear normal       Nose: Nose normal    Eyes:      General: No scleral icterus  Conjunctiva/sclera: Conjunctivae normal    Cardiovascular:      Rate and Rhythm: Normal rate  Pulmonary:      Effort: No respiratory distress  Abdominal:      General: There is no distension  Palpations: Abdomen is soft  Musculoskeletal:         General: Normal range of motion  Comments: OGS sequela knee's b/l   Skin:     Findings: No rash (on exposed skin  )  Neurological:      Mental Status: He is alert and oriented to person, place, and time  Psychiatric:         Behavior: Behavior is cooperative  Thought Content: Thought content normal          Result Review  Labs:  No visits with results within 3 Week(s) from this visit     Latest known visit with results is:   Appointment on 05/18/2023   Component Date Value Ref Range Status   • WBC 05/18/2023 2 37 (L)  4 31 - 10 16 Thousand/uL Final   • RBC 05/18/2023 4 30  3 88 - 5 62 Million/uL Final   • Hemoglobin 05/18/2023 13 9  12 0 - 17 0 g/dL Final   • Hematocrit 05/18/2023 41 4  36 5 - 49 3 % Final   • MCV 05/18/2023 96  82 - 98 fL Final   • MCH 05/18/2023 32 3  26 8 - 34 3 pg Final   • MCHC 05/18/2023 33 6  31 4 - 37 4 g/dL Final   • RDW 05/18/2023 11 9  11 6 - 15 1 % Final   • MPV 05/18/2023 11 9  8 9 - 12 7 fL Final   • Platelets 20/87/7727 149  149 - 390 " Thousands/uL Final   • nRBC 05/18/2023 0  /100 WBCs Final   • Neutrophils Relative 05/18/2023 40 (L)  43 - 75 % Final   • Immat GRANS % 05/18/2023 0  0 - 2 % Final   • Lymphocytes Relative 05/18/2023 44  14 - 44 % Final   • Monocytes Relative 05/18/2023 15 (H)  4 - 12 % Final   • Eosinophils Relative 05/18/2023 1  0 - 6 % Final   • Basophils Relative 05/18/2023 0  0 - 1 % Final   • Neutrophils Absolute 05/18/2023 0 94 (L)  1 85 - 7 62 Thousands/µL Final   • Immature Grans Absolute 05/18/2023 0 00  0 00 - 0 20 Thousand/uL Final   • Lymphocytes Absolute 05/18/2023 1 03  0 60 - 4 47 Thousands/µL Final   • Monocytes Absolute 05/18/2023 0 36  0 17 - 1 22 Thousand/µL Final   • Eosinophils Absolute 05/18/2023 0 03  0 00 - 0 61 Thousand/µL Final   • Basophils Absolute 05/18/2023 0 01  0 00 - 0 10 Thousands/µL Final   • Vit D, 25-Hydroxy 05/18/2023 36 7  30 0 - 100 0 ng/mL Final    Vitamin D guidelines established by Clinical Guidelines Subcommittee  of the Endocrine Society Task Force, 2011    Deficiency <20ng/ml   Insufficiency 20-30ng/ml   Sufficient  ng/ml    • Vitamin B-12 05/18/2023 636  180 - 914 pg/mL Final   • Folate 05/18/2023 21 4  >5 9 ng/mL Final    The World Health Organization has determined deficient folate concentrations are considered to be <4 0 ng/mL  • Iron Saturation 05/18/2023 25  20 - 50 % Final   • TIBC 05/18/2023 433  250 - 450 ug/dL Final   • Iron 05/18/2023 108  65 - 175 ug/dL Final    Patients treated with metal-binding drugs (ie  Deferoxamine) may have depressed iron values  • Ferritin 05/18/2023 9 (L)  24 - 336 ng/mL Final         Please note: This report has been generated by a voice recognition software system  Therefore there may be syntax, spelling, and/or grammatical errors  Please call if you have any questions

## 2023-06-22 NOTE — PATIENT INSTRUCTIONS
[facility] Oncology and Hematology Team  ACUITY [facility] AT [place] - [phone]    Your Team Members:  Advanced Practitioner:  Alice Roberts PA-C  Oncology Nurse:   Huang Jones RN ([phone]) M-F 8am - 4:30pm    Please answer Private and Unavailable Calls - this may be your team(s) contacting you  I  If you have medical questions/concerns/issues - contact us either by (1) My Chart (2) Hope Line

## 2023-06-23 ENCOUNTER — APPOINTMENT (OUTPATIENT)
Dept: LAB | Facility: CLINIC | Age: 33
End: 2023-06-23
Payer: COMMERCIAL

## 2023-06-23 DIAGNOSIS — E61.1 IRON DEFICIENCY: ICD-10-CM

## 2023-06-23 DIAGNOSIS — D72.819 LEUKOPENIA, UNSPECIFIED TYPE: ICD-10-CM

## 2023-06-23 LAB
ALBUMIN SERPL BCP-MCNC: 4.2 G/DL (ref 3.5–5)
ALP SERPL-CCNC: 57 U/L (ref 46–116)
ALT SERPL W P-5'-P-CCNC: 19 U/L (ref 12–78)
ANION GAP SERPL CALCULATED.3IONS-SCNC: 0 MMOL/L
AST SERPL W P-5'-P-CCNC: 27 U/L (ref 5–45)
BASOPHILS # BLD AUTO: 0.02 THOUSANDS/ÂΜL (ref 0–0.1)
BASOPHILS NFR BLD AUTO: 1 % (ref 0–1)
BILIRUB SERPL-MCNC: 0.74 MG/DL (ref 0.2–1)
BUN SERPL-MCNC: 20 MG/DL (ref 5–25)
CALCIUM SERPL-MCNC: 8.9 MG/DL (ref 8.3–10.1)
CHLORIDE SERPL-SCNC: 109 MMOL/L (ref 96–108)
CO2 SERPL-SCNC: 30 MMOL/L (ref 21–32)
CREAT SERPL-MCNC: 1 MG/DL (ref 0.6–1.3)
EOSINOPHIL # BLD AUTO: 0.09 THOUSAND/ÂΜL (ref 0–0.61)
EOSINOPHIL NFR BLD AUTO: 3 % (ref 0–6)
ERYTHROCYTE [DISTWIDTH] IN BLOOD BY AUTOMATED COUNT: 11.8 % (ref 11.6–15.1)
FERRITIN SERPL-MCNC: 14 NG/ML (ref 24–336)
GFR SERPL CREATININE-BSD FRML MDRD: 97 ML/MIN/1.73SQ M
GLUCOSE SERPL-MCNC: 88 MG/DL (ref 65–140)
HCT VFR BLD AUTO: 41.6 % (ref 36.5–49.3)
HEMOCCULT STL QL IA: NEGATIVE
HGB BLD-MCNC: 14.1 G/DL (ref 12–17)
IMM GRANULOCYTES # BLD AUTO: 0 THOUSAND/UL (ref 0–0.2)
IMM GRANULOCYTES NFR BLD AUTO: 0 % (ref 0–2)
IRON SATN MFR SERPL: 40 % (ref 20–50)
IRON SERPL-MCNC: 175 UG/DL (ref 65–175)
LYMPHOCYTES # BLD AUTO: 1.22 THOUSANDS/ÂΜL (ref 0.6–4.47)
LYMPHOCYTES NFR BLD AUTO: 41 % (ref 14–44)
MCH RBC QN AUTO: 32.6 PG (ref 26.8–34.3)
MCHC RBC AUTO-ENTMCNC: 33.9 G/DL (ref 31.4–37.4)
MCV RBC AUTO: 96 FL (ref 82–98)
MONOCYTES # BLD AUTO: 0.4 THOUSAND/ÂΜL (ref 0.17–1.22)
MONOCYTES NFR BLD AUTO: 14 % (ref 4–12)
NEUTROPHILS # BLD AUTO: 1.21 THOUSANDS/ÂΜL (ref 1.85–7.62)
NEUTS SEG NFR BLD AUTO: 41 % (ref 43–75)
NRBC BLD AUTO-RTO: 0 /100 WBCS
PLATELET # BLD AUTO: 139 THOUSANDS/UL (ref 149–390)
PMV BLD AUTO: 13.2 FL (ref 8.9–12.7)
POTASSIUM SERPL-SCNC: 4 MMOL/L (ref 3.5–5.3)
PROT SERPL-MCNC: 7.5 G/DL (ref 6.4–8.4)
RBC # BLD AUTO: 4.32 MILLION/UL (ref 3.88–5.62)
RETICS # AUTO: NORMAL 10*3/UL (ref 14356–105094)
RETICS # CALC: 0.84 % (ref 0.37–1.87)
SODIUM SERPL-SCNC: 139 MMOL/L (ref 135–147)
TIBC SERPL-MCNC: 433 UG/DL (ref 250–450)
TSH SERPL DL<=0.05 MIU/L-ACNC: 1.59 UIU/ML (ref 0.45–4.5)
WBC # BLD AUTO: 2.94 THOUSAND/UL (ref 4.31–10.16)

## 2023-06-23 PROCEDURE — 82525 ASSAY OF COPPER: CPT

## 2023-06-23 PROCEDURE — 83918 ORGANIC ACIDS TOTAL QUANT: CPT

## 2023-06-23 PROCEDURE — 86803 HEPATITIS C AB TEST: CPT

## 2023-06-23 PROCEDURE — 83540 ASSAY OF IRON: CPT

## 2023-06-23 PROCEDURE — 87389 HIV-1 AG W/HIV-1&-2 AB AG IA: CPT

## 2023-06-23 PROCEDURE — G0328 FECAL BLOOD SCRN IMMUNOASSAY: HCPCS

## 2023-06-23 PROCEDURE — 88185 FLOWCYTOMETRY/TC ADD-ON: CPT

## 2023-06-23 PROCEDURE — 82728 ASSAY OF FERRITIN: CPT

## 2023-06-23 PROCEDURE — 86704 HEP B CORE ANTIBODY TOTAL: CPT

## 2023-06-23 PROCEDURE — 86705 HEP B CORE ANTIBODY IGM: CPT

## 2023-06-23 PROCEDURE — 85025 COMPLETE CBC W/AUTO DIFF WBC: CPT

## 2023-06-23 PROCEDURE — 80053 COMPREHEN METABOLIC PANEL: CPT

## 2023-06-23 PROCEDURE — 85045 AUTOMATED RETICULOCYTE COUNT: CPT

## 2023-06-23 PROCEDURE — 36415 COLL VENOUS BLD VENIPUNCTURE: CPT

## 2023-06-23 PROCEDURE — 88184 FLOWCYTOMETRY/ TC 1 MARKER: CPT

## 2023-06-23 PROCEDURE — 84443 ASSAY THYROID STIM HORMONE: CPT

## 2023-06-23 PROCEDURE — 87340 HEPATITIS B SURFACE AG IA: CPT

## 2023-06-23 PROCEDURE — 83550 IRON BINDING TEST: CPT

## 2023-06-25 LAB
HBV CORE AB SER QL: NORMAL
HBV CORE IGM SER QL: NORMAL
HBV SURFACE AG SER QL: NORMAL
HCV AB SER QL: NORMAL
HIV 1+2 AB+HIV1 P24 AG SERPL QL IA: NORMAL
HIV 2 AB SERPL QL IA: NORMAL
HIV1 AB SERPL QL IA: NORMAL
HIV1 P24 AG SERPL QL IA: NORMAL

## 2023-06-26 LAB
METHYLMALONATE SERPL-SCNC: 172 NMOL/L (ref 0–378)
SCAN RESULT: NORMAL

## 2023-06-27 DIAGNOSIS — D72.819 LEUKOPENIA, UNSPECIFIED TYPE: ICD-10-CM

## 2023-06-27 DIAGNOSIS — E61.1 IRON DEFICIENCY: Primary | ICD-10-CM

## 2023-06-27 LAB — COPPER SERPL-MCNC: 81 UG/DL (ref 69–132)

## 2023-06-29 ENCOUNTER — TELEPHONE (OUTPATIENT)
Dept: HEMATOLOGY ONCOLOGY | Facility: CLINIC | Age: 33
End: 2023-06-29

## 2023-06-29 NOTE — TELEPHONE ENCOUNTER
What would be a preferred day of the week that would work best for your infusion appointment? Any day   Do you prefer mornings or afternoons for your appointments? Mid morning around 10am  Are there any days or dates that do not work for your schedule, including any upcoming vacations? N/a  We are going to try our best to schedule you at the infusion center closest to your home  In the event that we are unable to what would be your next preferred infusion site or sites? [facility]    Do you have transportation to take you to all of your appointments?  yes

## 2023-07-05 DIAGNOSIS — R21 RASH: Primary | ICD-10-CM

## 2023-07-05 RX ORDER — TRIAMCINOLONE ACETONIDE 1 MG/G
CREAM TOPICAL 2 TIMES DAILY
Qty: 30 G | Refills: 0 | Status: SHIPPED | OUTPATIENT
Start: 2023-07-05

## 2023-07-05 RX ORDER — KETOCONAZOLE 20 MG/G
CREAM TOPICAL 2 TIMES DAILY
Qty: 15 G | Refills: 0 | Status: SHIPPED | OUTPATIENT
Start: 2023-07-05

## 2023-07-12 ENCOUNTER — TELEPHONE (OUTPATIENT)
Dept: INFUSION CENTER | Facility: CLINIC | Age: 33
End: 2023-07-12

## 2023-07-12 NOTE — TELEPHONE ENCOUNTER
New patient phone call made and voicemail left confirming patients scheduled appointment tomorrow 7/13/23 @ 1030. Instructed patient to call back at [facility] infusion center if he had any questions.

## 2023-07-13 ENCOUNTER — HOSPITAL ENCOUNTER (OUTPATIENT)
Dept: INFUSION CENTER | Facility: CLINIC | Age: 33
Discharge: HOME | End: 2023-07-13
Payer: COMMERCIAL

## 2023-07-13 VITALS
HEART RATE: 66 BPM | DIASTOLIC BLOOD PRESSURE: 59 MMHG | RESPIRATION RATE: 18 BRPM | TEMPERATURE: 98.2 F | SYSTOLIC BLOOD PRESSURE: 123 MMHG

## 2023-07-13 DIAGNOSIS — K21.9 GASTROESOPHAGEAL REFLUX DISEASE WITHOUT ESOPHAGITIS: ICD-10-CM

## 2023-07-13 DIAGNOSIS — E61.1 IRON DEFICIENCY: Primary | ICD-10-CM

## 2023-07-13 DIAGNOSIS — D72.819 LEUKOPENIA, UNSPECIFIED TYPE: ICD-10-CM

## 2023-07-13 LAB
BASOPHILS # BLD AUTO: 0.01 THOUSANDS/ÂΜL (ref 0–0.1)
BASOPHILS NFR BLD AUTO: 0 % (ref 0–1)
EOSINOPHIL # BLD AUTO: 0.05 THOUSAND/ÂΜL (ref 0–0.61)
EOSINOPHIL NFR BLD AUTO: 2 % (ref 0–6)
ERYTHROCYTE [DISTWIDTH] IN BLOOD BY AUTOMATED COUNT: 11.9 % (ref 11.6–15.1)
HCT VFR BLD AUTO: 40.6 % (ref 36.5–49.3)
HGB BLD-MCNC: 13 G/DL (ref 12–17)
IMM GRANULOCYTES # BLD AUTO: 0 THOUSAND/UL (ref 0–0.2)
IMM GRANULOCYTES NFR BLD AUTO: 0 % (ref 0–2)
LYMPHOCYTES # BLD AUTO: 1.09 THOUSANDS/ÂΜL (ref 0.6–4.47)
LYMPHOCYTES NFR BLD AUTO: 43 % (ref 14–44)
MCH RBC QN AUTO: 31.7 PG (ref 26.8–34.3)
MCHC RBC AUTO-ENTMCNC: 32 G/DL (ref 31.4–37.4)
MCV RBC AUTO: 99 FL (ref 82–98)
MONOCYTES # BLD AUTO: 0.34 THOUSAND/ÂΜL (ref 0.17–1.22)
MONOCYTES NFR BLD AUTO: 14 % (ref 4–12)
NEUTROPHILS # BLD AUTO: 1.02 THOUSANDS/ÂΜL (ref 1.85–7.62)
NEUTS SEG NFR BLD AUTO: 41 % (ref 43–75)
NRBC BLD AUTO-RTO: 0 /100 WBCS
PLATELET # BLD AUTO: 135 THOUSANDS/UL (ref 149–390)
PMV BLD AUTO: 12.7 FL (ref 8.9–12.7)
RBC # BLD AUTO: 4.1 MILLION/UL (ref 3.88–5.62)
WBC # BLD AUTO: 2.51 THOUSAND/UL (ref 4.31–10.16)

## 2023-07-13 PROCEDURE — 96365 THER/PROPH/DIAG IV INF INIT: CPT

## 2023-07-13 PROCEDURE — 85025 COMPLETE CBC W/AUTO DIFF WBC: CPT | Performed by: INTERNAL MEDICINE

## 2023-07-13 RX ORDER — SODIUM CHLORIDE 9 MG/ML
20 INJECTION, SOLUTION INTRAVENOUS ONCE
Status: CANCELLED | OUTPATIENT
Start: 2023-07-20

## 2023-07-13 RX ORDER — SODIUM CHLORIDE 9 MG/ML
20 INJECTION, SOLUTION INTRAVENOUS ONCE
Status: COMPLETED | OUTPATIENT
Start: 2023-07-13 | End: 2023-07-13

## 2023-07-13 RX ADMIN — IRON SUCROSE 200 MG: 20 INJECTION, SOLUTION INTRAVENOUS at 11:00

## 2023-07-13 RX ADMIN — SODIUM CHLORIDE 20 ML/HR: 0.9 INJECTION, SOLUTION INTRAVENOUS at 10:57

## 2023-07-13 NOTE — PROGRESS NOTES
Pt arrived to unit without complaint, tolerated Venofer infusion without any adverse reaction. Pt left unit in stable condition without question or concern, AVS provided.

## 2023-07-17 DIAGNOSIS — R21 RASH: Primary | ICD-10-CM

## 2023-07-20 ENCOUNTER — HOSPITAL ENCOUNTER (OUTPATIENT)
Dept: INFUSION CENTER | Facility: CLINIC | Age: 33
Discharge: HOME | End: 2023-07-20
Payer: COMMERCIAL

## 2023-07-20 VITALS
HEART RATE: 64 BPM | DIASTOLIC BLOOD PRESSURE: 65 MMHG | SYSTOLIC BLOOD PRESSURE: 103 MMHG | TEMPERATURE: 98.7 F | RESPIRATION RATE: 18 BRPM

## 2023-07-20 DIAGNOSIS — E61.1 IRON DEFICIENCY: Primary | ICD-10-CM

## 2023-07-20 DIAGNOSIS — K21.9 GASTROESOPHAGEAL REFLUX DISEASE WITHOUT ESOPHAGITIS: ICD-10-CM

## 2023-07-20 DIAGNOSIS — D72.819 LEUKOPENIA, UNSPECIFIED TYPE: ICD-10-CM

## 2023-07-20 PROCEDURE — 96365 THER/PROPH/DIAG IV INF INIT: CPT

## 2023-07-20 RX ORDER — SODIUM CHLORIDE 9 MG/ML
20 INJECTION, SOLUTION INTRAVENOUS ONCE
Status: COMPLETED | OUTPATIENT
Start: 2023-07-20 | End: 2023-07-20

## 2023-07-20 RX ORDER — SODIUM CHLORIDE 9 MG/ML
20 INJECTION, SOLUTION INTRAVENOUS ONCE
Status: CANCELLED | OUTPATIENT
Start: 2023-07-27

## 2023-07-20 RX ADMIN — SODIUM CHLORIDE 20 ML/HR: 0.9 INJECTION, SOLUTION INTRAVENOUS at 10:37

## 2023-07-20 RX ADMIN — SODIUM CHLORIDE 200 MG: 9 INJECTION, SOLUTION INTRAVENOUS at 10:52

## 2023-07-20 NOTE — PROGRESS NOTES
Pt tolerated venofer infusion today without incident. Pt declined AVS but is aware of his next appt.

## 2023-07-26 ENCOUNTER — OFFICE VISIT (OUTPATIENT)
Dept: DERMATOLOGY | Facility: CLINIC | Age: 33
End: 2023-07-26
Payer: COMMERCIAL

## 2023-07-26 VITALS — BODY MASS INDEX: 20.9 KG/M2 | HEIGHT: 73 IN | WEIGHT: 157.7 LBS | TEMPERATURE: 99.1 F

## 2023-07-26 DIAGNOSIS — Z87.2 HISTORY OF DERMATITIS: Primary | ICD-10-CM

## 2023-07-26 PROCEDURE — 99204 OFFICE O/P NEW MOD 45 MIN: CPT | Performed by: DERMATOLOGY

## 2023-07-26 NOTE — PROGRESS NOTES
[facility] Note     Patient Name: Alexis Block  Encounter Date: 7/26/23     Have you been cared for by a [facility] D[facility]atologist in the last 3 years and, if so, which description applies to you? NO. I am considered a "new" patient and must complete all patient intake questions. I am MALE/not capable of bearing children. REVIEW OF SYSTEMS:  Have you recently had or currently have any of the following? · Recent fever or chills? No  · Any non-healing wound? No   PAST MEDICAL HISTORY:  Have you personally ever had or currently have any of the following? If "YES," then please provide more detail. · Skin cancer (such as Melanoma, Basal Cell Carcinoma, Squamous Cell Carcinoma? No  · Tuberculosis, HIV/AIDS, Hepatitis B or C: No  · Systemic Immunosuppression such as Diabetes, Biologic or Immunotherapy, Chemotherapy, Organ Transplantation, Bone Marrow Transplantation No  · Radiation Treatment No   FAMILY HISTORY:  Any "first degree relatives" (parent, brother, sister, or child) with the following? • Skin Cancer, Pancreatic or Other Cancer? No - unaware of family history   PATIENT EXPERIENCE:    • Do you want the Dermatologist to perform a COMPLETE skin exam today including a clinical examination under the "bra and underwear" areas? NO  • If necessary, do we have your permission to call and leave a detailed message on your Preferred Phone number that includes your specific medical information?   Yes      No Known Allergies   Current Outpatient Medications:   •  albuterol (Ventolin HFA) 90 mcg/act inhaler, Inhale 1-2 puffs every 6 (six) hours as needed for wheezing, Disp: 8 g, Rfl: 0  •  levocetirizine (XYZAL) 5 MG tablet, Take 1 tablet (5 mg total) by mouth every evening ( for allergies ), Disp: 90 tablet, Rfl: 1  •  pantoprazole (PROTONIX) 40 mg tablet, Take 1 tablet (40 mg total) by mouth 2 (two) times a day, Disp: 180 tablet, Rfl: 2  •  ketoconazole (NIZORAL) 2 % cream, Apply topically 2 (two) times a day (Patient not taking: Reported on 7/26/2023), Disp: 15 g, Rfl: 0  •  nystatin (MYCOSTATIN) powder, Apply topically 2 (two) times a day (Patient not taking: Reported on 7/26/2023), Disp: 60 g, Rfl: 1  •  triamcinolone (KENALOG) 0.1 % cream, Apply topically 2 (two) times a day (Patient not taking: Reported on 7/26/2023), Disp: 30 g, Rfl: 0          • Whom besides the patient is providing clinical information about today's encounter?   o NO ADDITIONAL HISTORIAN (patient alone provided history)    Physical Exam and Assessment/Plan by Diagnosis:    HISTORY OF INGUINAL/GROIN DERMATITIS; RASH / YEAST OR BACTERIAL OVERGROWTH  VS SEBORRHEIC DERMATITIS     Physical Exam:  • (Anatomic Location); (Size and Morphological Description); (Differential Diagnosis):  o Groin area  • Pertinent Positives:  • Pertinent Negatives:  WNL: no active dermatitis. Additional History of Present Condition:  New patient, 28year old male, here for an active rash. Present for about 6 months, now. Very itchy and bothersome when sweating or exercising. Comes and goes, but never fully goes away completely. Patient has tried many over the counter sprays and powders. He has also tried Ketoconazole (NIZORAL) 2% Cream, Nystatin (MYCOSTATIN) powder, and Triamcinolone (KENALOG) 0.1% Cream with no improvement as far as going away completely. Patient did provide photos on his phone from when the rash was more active.      Assessment and Plan:  Based on a thorough discussion of this condition and the management approach to it (including a comprehensive discussion of the known risks, side effects and potential benefits of treatment), the patient (family) agrees to implement the following specific plan:  • Please start topical medication prescribed by the provider - Apply topically twice a day for 2-4 weeks   • Follow up if symptoms worsen or fail to improve      Scribe Jose Alfredo PETE,:  Lindsay Ibrahim am acting as a scribe while in the presence of the attending physician.:       I,:  Ashvin Cho MD personally performed the services described in this documentation    as scribed in my presence.:

## 2023-07-26 NOTE — PATIENT INSTRUCTIONS
Physical Exam and Assessment/Plan by Diagnosis:    RASH / YEAST OR BACTERIAL OVERGROWTH  VS SEBORRHEIC DERMATITIS     Assessment and Plan:  Based on a thorough discussion of this condition and the management approach to it (including a comprehensive discussion of the known risks, side effects and potential benefits of treatment), the patient (family) agrees to implement the following specific plan:  Please start topical medication prescribed by the provider - Apply topically twice a day for 2-4 weeks   Follow up if symptoms worsen or fail to improve

## 2023-07-27 ENCOUNTER — HOSPITAL ENCOUNTER (OUTPATIENT)
Dept: INFUSION CENTER | Facility: CLINIC | Age: 33
Discharge: HOME | End: 2023-07-27
Payer: COMMERCIAL

## 2023-07-27 VITALS
TEMPERATURE: 98.1 F | HEART RATE: 65 BPM | SYSTOLIC BLOOD PRESSURE: 118 MMHG | RESPIRATION RATE: 18 BRPM | DIASTOLIC BLOOD PRESSURE: 67 MMHG

## 2023-07-27 DIAGNOSIS — E61.1 IRON DEFICIENCY: Primary | ICD-10-CM

## 2023-07-27 DIAGNOSIS — K21.9 GASTROESOPHAGEAL REFLUX DISEASE WITHOUT ESOPHAGITIS: ICD-10-CM

## 2023-07-27 DIAGNOSIS — D72.819 LEUKOPENIA, UNSPECIFIED TYPE: ICD-10-CM

## 2023-07-27 PROCEDURE — 96365 THER/PROPH/DIAG IV INF INIT: CPT

## 2023-07-27 RX ORDER — SODIUM CHLORIDE 9 MG/ML
20 INJECTION, SOLUTION INTRAVENOUS ONCE
Status: CANCELLED | OUTPATIENT
Start: 2023-08-03

## 2023-07-27 RX ORDER — SODIUM CHLORIDE 9 MG/ML
20 INJECTION, SOLUTION INTRAVENOUS ONCE
Status: COMPLETED | OUTPATIENT
Start: 2023-07-27 | End: 2023-07-27

## 2023-07-27 RX ADMIN — SODIUM CHLORIDE 200 MG: 9 INJECTION, SOLUTION INTRAVENOUS at 10:37

## 2023-07-27 RX ADMIN — SODIUM CHLORIDE 20 ML/HR: 0.9 INJECTION, SOLUTION INTRAVENOUS at 10:28

## 2023-07-27 NOTE — PROGRESS NOTES
Pt arrived to unit without complaint. Pt tolerated Venofer without incident. AVS declined, but aware of future appts. Pt left unit in stable condition.

## 2023-08-03 ENCOUNTER — HOSPITAL ENCOUNTER (OUTPATIENT)
Dept: INFUSION CENTER | Facility: CLINIC | Age: 33
Discharge: HOME | End: 2023-08-03
Payer: COMMERCIAL

## 2023-08-03 VITALS
TEMPERATURE: 97 F | DIASTOLIC BLOOD PRESSURE: 70 MMHG | SYSTOLIC BLOOD PRESSURE: 114 MMHG | HEART RATE: 67 BPM | RESPIRATION RATE: 18 BRPM

## 2023-08-03 DIAGNOSIS — E61.1 IRON DEFICIENCY: Primary | ICD-10-CM

## 2023-08-03 DIAGNOSIS — K21.9 GASTROESOPHAGEAL REFLUX DISEASE WITHOUT ESOPHAGITIS: ICD-10-CM

## 2023-08-03 DIAGNOSIS — D72.819 LEUKOPENIA, UNSPECIFIED TYPE: ICD-10-CM

## 2023-08-03 LAB
BASOPHILS # BLD AUTO: 0.01 THOUSANDS/ÂΜL (ref 0–0.1)
BASOPHILS NFR BLD AUTO: 0 % (ref 0–1)
EOSINOPHIL # BLD AUTO: 0.03 THOUSAND/ÂΜL (ref 0–0.61)
EOSINOPHIL NFR BLD AUTO: 1 % (ref 0–6)
ERYTHROCYTE [DISTWIDTH] IN BLOOD BY AUTOMATED COUNT: 12.1 % (ref 11.6–15.1)
HCT VFR BLD AUTO: 39.3 % (ref 36.5–49.3)
HGB BLD-MCNC: 13 G/DL (ref 12–17)
IMM GRANULOCYTES # BLD AUTO: 0 THOUSAND/UL (ref 0–0.2)
IMM GRANULOCYTES NFR BLD AUTO: 0 % (ref 0–2)
LYMPHOCYTES # BLD AUTO: 1.17 THOUSANDS/ÂΜL (ref 0.6–4.47)
LYMPHOCYTES NFR BLD AUTO: 44 % (ref 14–44)
MCH RBC QN AUTO: 31.7 PG (ref 26.8–34.3)
MCHC RBC AUTO-ENTMCNC: 33.1 G/DL (ref 31.4–37.4)
MCV RBC AUTO: 96 FL (ref 82–98)
MONOCYTES # BLD AUTO: 0.42 THOUSAND/ÂΜL (ref 0.17–1.22)
MONOCYTES NFR BLD AUTO: 15 % (ref 4–12)
NEUTROPHILS # BLD AUTO: 1.09 THOUSANDS/ÂΜL (ref 1.85–7.62)
NEUTS SEG NFR BLD AUTO: 40 % (ref 43–75)
NRBC BLD AUTO-RTO: 0 /100 WBCS
PLATELET # BLD AUTO: 138 THOUSANDS/UL (ref 149–390)
PMV BLD AUTO: 12.8 FL (ref 8.9–12.7)
RBC # BLD AUTO: 4.1 MILLION/UL (ref 3.88–5.62)
WBC # BLD AUTO: 2.72 THOUSAND/UL (ref 4.31–10.16)

## 2023-08-03 PROCEDURE — 96365 THER/PROPH/DIAG IV INF INIT: CPT

## 2023-08-03 PROCEDURE — 85025 COMPLETE CBC W/AUTO DIFF WBC: CPT | Performed by: INTERNAL MEDICINE

## 2023-08-03 RX ORDER — SODIUM CHLORIDE 9 MG/ML
20 INJECTION, SOLUTION INTRAVENOUS ONCE
Status: COMPLETED | OUTPATIENT
Start: 2023-08-03 | End: 2023-08-03

## 2023-08-03 RX ORDER — SODIUM CHLORIDE 9 MG/ML
20 INJECTION, SOLUTION INTRAVENOUS ONCE
Status: CANCELLED | OUTPATIENT
Start: 2023-08-10

## 2023-08-03 RX ADMIN — SODIUM CHLORIDE 20 ML/HR: 0.9 INJECTION, SOLUTION INTRAVENOUS at 10:13

## 2023-08-03 RX ADMIN — SODIUM CHLORIDE 200 MG: 9 INJECTION, SOLUTION INTRAVENOUS at 10:15

## 2023-08-10 ENCOUNTER — HOSPITAL ENCOUNTER (OUTPATIENT)
Dept: INFUSION CENTER | Facility: CLINIC | Age: 33
Discharge: HOME | End: 2023-08-10
Payer: COMMERCIAL

## 2023-08-10 VITALS
TEMPERATURE: 97.2 F | SYSTOLIC BLOOD PRESSURE: 119 MMHG | DIASTOLIC BLOOD PRESSURE: 69 MMHG | HEART RATE: 67 BPM | RESPIRATION RATE: 16 BRPM

## 2023-08-10 DIAGNOSIS — E61.1 IRON DEFICIENCY: Primary | ICD-10-CM

## 2023-08-10 DIAGNOSIS — D72.819 LEUKOPENIA, UNSPECIFIED TYPE: ICD-10-CM

## 2023-08-10 DIAGNOSIS — K21.9 GASTROESOPHAGEAL REFLUX DISEASE WITHOUT ESOPHAGITIS: ICD-10-CM

## 2023-08-10 PROCEDURE — 96365 THER/PROPH/DIAG IV INF INIT: CPT

## 2023-08-10 RX ORDER — SODIUM CHLORIDE 9 MG/ML
20 INJECTION, SOLUTION INTRAVENOUS ONCE
Status: COMPLETED | OUTPATIENT
Start: 2023-08-10 | End: 2023-08-10

## 2023-08-10 RX ORDER — SODIUM CHLORIDE 9 MG/ML
20 INJECTION, SOLUTION INTRAVENOUS ONCE
Status: CANCELLED | OUTPATIENT
Start: 2023-08-17

## 2023-08-10 RX ADMIN — SODIUM CHLORIDE 200 MG: 9 INJECTION, SOLUTION INTRAVENOUS at 10:14

## 2023-08-10 RX ADMIN — SODIUM CHLORIDE 20 ML/HR: 0.9 INJECTION, SOLUTION INTRAVENOUS at 10:05

## 2023-08-10 NOTE — PLAN OF CARE
Problem: INFECTION - ADULT  Goal: Absence or prevention of progression during hospitalization  Description: INTERVENTIONS:  - Assess and monitor for signs and symptoms of infection  - Monitor lab/diagnostic results  - Monitor all insertion sites, i.e. indwelling lines, tubes, and drains  - Monitor endotracheal if appropriate and nasal secretions for changes in amount and color  - [place] appropriate cooling/warming therapies per order  - Administer medications as ordered  - Instruct and encourage patient and family to use good hand hygiene technique  - Identify and instruct in appropriate isolation precautions for identified infection/condition  Outcome: Progressing  Goal: Absence of fever/infection during neutropenic period  Description: INTERVENTIONS:  - Monitor WBC    Outcome: Progressing     Problem: Knowledge Deficit  Goal: Patient/family/caregiver demonstrates understanding of disease process, treatment plan, medications, and discharge instructions  Description: Complete learning assessment and assess knowledge base.   Interventions:  - Provide teaching at level of understanding  - Provide teaching via preferred learning methods  Outcome: Progressing

## 2023-08-10 NOTE — PROGRESS NOTES
Pt. Denies new symptoms or concerns today. Pt. Tolerated Venofer without adverse event. Future appointments reviewed. AVS declined.

## 2023-08-17 ENCOUNTER — HOSPITAL ENCOUNTER (OUTPATIENT)
Dept: INFUSION CENTER | Facility: CLINIC | Age: 33
Discharge: HOME | End: 2023-08-17
Payer: COMMERCIAL

## 2023-08-17 VITALS
SYSTOLIC BLOOD PRESSURE: 124 MMHG | HEART RATE: 68 BPM | TEMPERATURE: 98.1 F | DIASTOLIC BLOOD PRESSURE: 62 MMHG | RESPIRATION RATE: 16 BRPM

## 2023-08-17 DIAGNOSIS — E61.1 IRON DEFICIENCY: Primary | ICD-10-CM

## 2023-08-17 DIAGNOSIS — K21.9 GASTROESOPHAGEAL REFLUX DISEASE WITHOUT ESOPHAGITIS: ICD-10-CM

## 2023-08-17 DIAGNOSIS — D72.819 LEUKOPENIA, UNSPECIFIED TYPE: ICD-10-CM

## 2023-08-17 PROCEDURE — 96365 THER/PROPH/DIAG IV INF INIT: CPT

## 2023-08-17 RX ORDER — SODIUM CHLORIDE 9 MG/ML
20 INJECTION, SOLUTION INTRAVENOUS ONCE
Status: CANCELLED | OUTPATIENT
Start: 2023-08-24

## 2023-08-17 RX ORDER — SODIUM CHLORIDE 9 MG/ML
20 INJECTION, SOLUTION INTRAVENOUS ONCE
Status: COMPLETED | OUTPATIENT
Start: 2023-08-17 | End: 2023-08-17

## 2023-08-17 RX ADMIN — SODIUM CHLORIDE 20 ML/HR: 9 INJECTION, SOLUTION INTRAVENOUS at 10:11

## 2023-08-17 RX ADMIN — IRON SUCROSE 200 MG: 20 INJECTION, SOLUTION INTRAVENOUS at 10:11

## 2023-08-17 NOTE — PROGRESS NOTES
Pt presents for venofer. No new meds or concerns. Pt tolerated treatment without adverse reaction. Future appointments discussed. AVS declined.

## 2023-08-24 ENCOUNTER — HOSPITAL ENCOUNTER (OUTPATIENT)
Dept: INFUSION CENTER | Facility: CLINIC | Age: 33
Discharge: HOME | End: 2023-08-24
Payer: COMMERCIAL

## 2023-08-24 VITALS
DIASTOLIC BLOOD PRESSURE: 66 MMHG | RESPIRATION RATE: 18 BRPM | SYSTOLIC BLOOD PRESSURE: 130 MMHG | TEMPERATURE: 97.4 F | HEART RATE: 69 BPM

## 2023-08-24 DIAGNOSIS — D72.819 LEUKOPENIA, UNSPECIFIED TYPE: ICD-10-CM

## 2023-08-24 DIAGNOSIS — E61.1 IRON DEFICIENCY: Primary | ICD-10-CM

## 2023-08-24 DIAGNOSIS — K21.9 GASTROESOPHAGEAL REFLUX DISEASE WITHOUT ESOPHAGITIS: ICD-10-CM

## 2023-08-24 PROCEDURE — 96365 THER/PROPH/DIAG IV INF INIT: CPT

## 2023-08-24 RX ORDER — SODIUM CHLORIDE 9 MG/ML
20 INJECTION, SOLUTION INTRAVENOUS ONCE
Status: COMPLETED | OUTPATIENT
Start: 2023-08-24 | End: 2023-08-24

## 2023-08-24 RX ORDER — SODIUM CHLORIDE 9 MG/ML
20 INJECTION, SOLUTION INTRAVENOUS ONCE
Status: CANCELLED | OUTPATIENT
Start: 2023-08-24

## 2023-08-24 RX ADMIN — IRON SUCROSE 200 MG: 20 INJECTION, SOLUTION INTRAVENOUS at 10:03

## 2023-08-24 RX ADMIN — SODIUM CHLORIDE 20 ML/HR: 0.9 INJECTION, SOLUTION INTRAVENOUS at 09:54

## 2023-08-24 NOTE — PROGRESS NOTES
Pt arrived to unit without complaint. Pt tolerated Venofer without incident. AVS declined, no future appts at this time. Pt left unit in stable condition.

## 2023-09-07 DIAGNOSIS — K21.9 GASTROESOPHAGEAL REFLUX DISEASE WITHOUT ESOPHAGITIS: ICD-10-CM

## 2023-09-07 RX ORDER — PANTOPRAZOLE SODIUM 40 MG/1
40 TABLET, DELAYED RELEASE ORAL 2 TIMES DAILY
Qty: 180 TABLET | Refills: 2 | Status: SHIPPED | OUTPATIENT
Start: 2023-09-07

## 2023-09-15 ENCOUNTER — APPOINTMENT (OUTPATIENT)
Dept: LAB | Facility: CLINIC | Age: 33
End: 2023-09-15
Payer: COMMERCIAL

## 2023-09-15 DIAGNOSIS — D72.819 LEUKOPENIA, UNSPECIFIED TYPE: ICD-10-CM

## 2023-09-15 DIAGNOSIS — E61.1 IRON DEFICIENCY: ICD-10-CM

## 2023-09-15 LAB
BASOPHILS # BLD AUTO: 0.01 THOUSANDS/ÂΜL (ref 0–0.1)
BASOPHILS NFR BLD AUTO: 0 % (ref 0–1)
EOSINOPHIL # BLD AUTO: 0.07 THOUSAND/ÂΜL (ref 0–0.61)
EOSINOPHIL NFR BLD AUTO: 3 % (ref 0–6)
ERYTHROCYTE [DISTWIDTH] IN BLOOD BY AUTOMATED COUNT: 12 % (ref 11.6–15.1)
FERRITIN SERPL-MCNC: 159 NG/ML (ref 24–336)
HCT VFR BLD AUTO: 40.1 % (ref 36.5–49.3)
HGB BLD-MCNC: 13.4 G/DL (ref 12–17)
IMM GRANULOCYTES # BLD AUTO: 0.01 THOUSAND/UL (ref 0–0.2)
IMM GRANULOCYTES NFR BLD AUTO: 0 % (ref 0–2)
IRON SATN MFR SERPL: 52 % (ref 15–50)
IRON SERPL-MCNC: 148 UG/DL (ref 50–212)
LYMPHOCYTES # BLD AUTO: 0.99 THOUSANDS/ÂΜL (ref 0.6–4.47)
LYMPHOCYTES NFR BLD AUTO: 42 % (ref 14–44)
MCH RBC QN AUTO: 32.9 PG (ref 26.8–34.3)
MCHC RBC AUTO-ENTMCNC: 33.4 G/DL (ref 31.4–37.4)
MCV RBC AUTO: 99 FL (ref 82–98)
MONOCYTES # BLD AUTO: 0.28 THOUSAND/ÂΜL (ref 0.17–1.22)
MONOCYTES NFR BLD AUTO: 12 % (ref 4–12)
NEUTROPHILS # BLD AUTO: 0.99 THOUSANDS/ÂΜL (ref 1.85–7.62)
NEUTS SEG NFR BLD AUTO: 43 % (ref 43–75)
NRBC BLD AUTO-RTO: 0 /100 WBCS
PLATELET # BLD AUTO: 131 THOUSANDS/UL (ref 149–390)
PMV BLD AUTO: 12.9 FL (ref 8.9–12.7)
RBC # BLD AUTO: 4.07 MILLION/UL (ref 3.88–5.62)
TIBC SERPL-MCNC: 287 UG/DL (ref 250–450)
UIBC SERPL-MCNC: 139 UG/DL (ref 155–355)
WBC # BLD AUTO: 2.35 THOUSAND/UL (ref 4.31–10.16)

## 2023-09-15 PROCEDURE — 36415 COLL VENOUS BLD VENIPUNCTURE: CPT

## 2023-09-15 PROCEDURE — 82728 ASSAY OF FERRITIN: CPT

## 2023-09-15 PROCEDURE — 85025 COMPLETE CBC W/AUTO DIFF WBC: CPT

## 2023-09-15 PROCEDURE — 83550 IRON BINDING TEST: CPT

## 2023-09-15 PROCEDURE — 83540 ASSAY OF IRON: CPT

## 2023-09-25 ENCOUNTER — OFFICE VISIT (OUTPATIENT)
Dept: HEMATOLOGY ONCOLOGY | Facility: CLINIC | Age: 33
End: 2023-09-25
Payer: COMMERCIAL

## 2023-09-25 VITALS
OXYGEN SATURATION: 99 % | DIASTOLIC BLOOD PRESSURE: 64 MMHG | WEIGHT: 156 LBS | HEIGHT: 73 IN | RESPIRATION RATE: 18 BRPM | TEMPERATURE: 98.1 F | BODY MASS INDEX: 20.67 KG/M2 | SYSTOLIC BLOOD PRESSURE: 118 MMHG | HEART RATE: 64 BPM

## 2023-09-25 DIAGNOSIS — D72.819 LEUKOPENIA, UNSPECIFIED TYPE: ICD-10-CM

## 2023-09-25 DIAGNOSIS — E61.1 IRON DEFICIENCY: Primary | ICD-10-CM

## 2023-09-25 DIAGNOSIS — D69.6 THROMBOCYTOPENIA (HCC): ICD-10-CM

## 2023-09-25 DIAGNOSIS — K21.9 GASTROESOPHAGEAL REFLUX DISEASE WITHOUT ESOPHAGITIS: ICD-10-CM

## 2023-09-25 PROCEDURE — 99215 OFFICE O/P EST HI 40 MIN: CPT | Performed by: PHYSICIAN ASSISTANT

## 2023-09-25 NOTE — PROGRESS NOTES
[address] [facility] [address]  Hematology [address] [facility], 1990, [id number]  9/25/2023      Assessment and Plan   1. Iron deficiency; 2. Gastroesophageal reflux disease without esophagitis  Resolved. Patient did not feel any major symptomatic improvement more unfortunately patient did not have any movement with other cell line deficiencies. No additional supplementation is necessary at this time. We did discuss that further work-up may be in fact needed regarding iron deficiency issues. Patient has been on Protonix for 2 years, patient has never had a colonoscopy, but fecal occult blood testing has been negative. Discussed that I believe the etiology to be multifactorial with some malabsorption concerns as well as dietary concerns. We will continue to observe see blood work below-2 prior to 4-month follow-up. Additional vitamins have been requested which will include magnesium, calcium and potassium.   - Iron Panel (Includes Ferritin, Iron Sat%, Iron, and TIBC); Future  - CBC and differential; Future    3. Leukopenia, unspecified type; 4. Thrombocytopenia (HCC)  Patient's leukopenia has remained somewhat stable. No major changes. Patient is asymptomatic. Initial work-up did not demonstrate any underlying abnormality on flow cytometry. Future directions: Today the patient I had extensive discussion. Patient will follow-up every 4 months with blood testing. We discussed that in the future if there is any changes that a bone marrow biopsy and ultrasound of the liver and spleen may be required. However, with no other additional abnormalities today symptomatically feeling fine, additional testing is not necessary.  - Magnesium; Future  - Comprehensive metabolic panel; Future  - Iron Panel (Includes Ferritin, Iron Sat%, Iron, and TIBC);  Future  - CBC and differential; Future    The patient is scheduled for follow-up in approximately 4 months. Patient voiced agreement and understanding to the above. Patient advised to call the Hematology/Oncology office with any questions and concerns regarding the above. Barrier(s) to care: None  The patient is able to self care. Lynn Heck PA-C  Medical Oncology/Hematology  [address] [facility]    Subjective     Chief Complaint   Patient presents with   • Follow-up       History of present illness: This is a 71-year-old male with past medical history of seasonal allergies and GERD- secondary to lower esophageal sphincter dysfunction diagnosed in 2021 who presents to the hematology clinic for evaluation of iron deficiency anemia and leukopenia.     Leukopenia:  • No symptoms of leukopenia or B symptoms. Patient does have some recurring fungal infections however he is an avid hiker and suffers from recurrent athlete's foot, occasionally will spread to the groin or to the trunk. • No social history contributory to viral etiology however, patient's mother required blood transfusions in the 80s. She subsequently was diagnosed with hepatitis and treated accordingly. No other details are known about this issue. Blood transfusion occurred prior to patient's birth.   • Trends:  ? 8/30/2021 WBC 2.96, hemoglobin 14.9, MCV 98, platelets 089, ANC 7.26  ? 8/3/2022 WBC 3.67, hemoglobin 13.9, MCV 98, platelets 320, no differential  • 5/18/2023 B12 = 636, folate = 21.4, WBC 2.3, hemoglobin 13.9, MCV 96, platelets 656, ANC 4.37 differential, within normal limits  • 6/23/2023 WBC 2.94, hemoglobin 14.1, platelet count 158, ANC 1.2  • Negative chronic hepatitis, negative HIV  • Recheck within normal limits, copper within normal limits, MMA within normal limits  • Flow cytometry negative  9/15/2023 WBC 2.35, hemoglobin 13.4, MCV 99, platelet count 771       Iron deficiency anemia:  • Never known issue prior to recent blood testing which was completed through GI office. • Endoscopy without signs or symptoms of chronic blood loss anemia, no history of colonoscopy. • Patient restricts diet with limited beef and pork. However patient is conscious of this decision and takes a multivitamin regularly. • Patient has sleep disturbances, occasional fatigue. • Patient mountain bikes and hikes frequently. • Family history noncontributory/limited with no known extended family on mother side and paternal grandfather passing early but of unknown causes. • See trends above. Ferritin = 9, TIBC 433, iron saturation 25%  • Fecal occult blood testing negative. • 7/13 through 8/24/2023: Venofer 200 mg x 7 doses. • 9/15/2023 WBC 2.35, hemoglobin 13.4, MCV 99, platelet count 273  · Ferritin = 159    Interval history: Feeling okay no major changes with IV administration. Review of Systems   Constitutional: Negative for activity change, appetite change, fatigue and fever. HENT: Negative for nosebleeds. Respiratory: Negative for cough, choking and shortness of breath. Cardiovascular: Negative for chest pain, palpitations and leg swelling. Gastrointestinal: Negative for abdominal distention, abdominal pain, anal bleeding, blood in stool, constipation, diarrhea, nausea and vomiting. Endocrine: Negative for cold intolerance. Genitourinary: Negative for hematuria. Musculoskeletal: Negative for myalgias. Skin: Negative for color change, pallor and rash. Allergic/Immunologic: Negative for immunocompromised state. Neurological: Negative for headaches. Hematological: Negative for adenopathy. Does not bruise/bleed easily. All other systems reviewed and are negative.       Patient Active Problem List   Diagnosis   • Mild intermittent asthma without complication   • Allergic rhinitis   • Gastroesophageal reflux disease    • Globus sensation throat   • Leukocytopenia ( WBC 2.75 )   • Rash   • Fungal rash of trunk   • Iron deficiency   • Leukopenia Past Medical History:   Diagnosis Date   • Asthma    • GERD (gastroesophageal reflux disease)    • Iron deficiency anemia      Past Surgical History:   Procedure Laterality Date   • WISDOM TOOTH EXTRACTION       Family History   Problem Relation Age of Onset   • Arthritis Mother    • Colon polyps Father    • Hypertension Father    • Stroke Maternal Grandfather      Social History     Socioeconomic History   • Marital status: Single     Spouse name: None   • Number of children: None   • Years of education: None   • Highest education level: None   Occupational History   • Occupation: enemplyed   Tobacco Use   • Smoking status: Never     Passive exposure: Past (family members smoked over 10 years ago)   • Smokeless tobacco: Never   Vaping Use   • Vaping Use: Never used   Substance and Sexual Activity   • Alcohol use: Never   • Drug use: Never   • Sexual activity: Not Currently     Partners: Female   Other Topics Concern   • None   Social History Narrative   • None     Social Determinants of Health     Financial Resource Strain: Not on file   Food Insecurity: Not on file   Transportation Needs: Not on file   Physical Activity: Not on file   Stress: Not on file   Social Connections: Not on file   Intimate Partner Violence: Not on file   Housing Stability: Not on file       Current Outpatient Medications:   •  albuterol (Ventolin HFA) 90 mcg/act inhaler, Inhale 1-2 puffs every 6 (six) hours as needed for wheezing, Disp: 8 g, Rfl: 0  •  ciclopirox (LOPROX) 0.77 % cream, Apply topically 2 (two) times a day For 2-4 weeks. , Disp: 90 g, Rfl: 0  •  levocetirizine (XYZAL) 5 MG tablet, Take 1 tablet (5 mg total) by mouth every evening ( for allergies ), Disp: 90 tablet, Rfl: 1  •  pantoprazole (PROTONIX) 40 mg tablet, TAKE ONE TABLET BY MOUTH TWICE A DAY, Disp: 180 tablet, Rfl: 2  •  ketoconazole (NIZORAL) 2 % cream, Apply topically 2 (two) times a day (Patient not taking: Reported on 7/26/2023), Disp: 15 g, Rfl: 0  • triamcinolone (KENALOG) 0.1 % cream, Apply topically 2 (two) times a day (Patient not taking: Reported on 7/26/2023), Disp: 30 g, Rfl: 0  No Known Allergies    Objective   /64 (BP Location: Left arm, Patient Position: Sitting, Cuff Size: Adult)   Pulse 64   Temp 98.1 °F (36.7 °C) (Temporal)   Resp 18   Ht 6' 1" (1.854 m)   Wt 70.8 kg (156 lb)   SpO2 99%   BMI 20.58 kg/m²    Physical Exam  Constitutional:       General: He is not in acute distress. Appearance: He is well-developed. HENT:      Head: Normocephalic and atraumatic. Right Ear: External ear normal.      Left Ear: External ear normal.      Nose: Nose normal.   Eyes:      General: No scleral icterus. Conjunctiva/sclera: Conjunctivae normal.   Cardiovascular:      Rate and Rhythm: Normal rate. Pulmonary:      Effort: No respiratory distress. Abdominal:      General: There is no distension. Palpations: Abdomen is soft. Skin:     Findings: No rash (on exposed skin. ). Neurological:      Mental Status: He is alert and oriented to person, place, and time. Psychiatric:         Thought Content:  Thought content normal.         Result Review  Labs:  Appointment on 09/15/2023   Component Date Value Ref Range Status   • WBC 09/15/2023 2.35 (L)  4.31 - 10.16 Thousand/uL Final   • RBC 09/15/2023 4.07  3.88 - 5.62 Million/uL Final   • Hemoglobin 09/15/2023 13.4  12.0 - 17.0 g/dL Final   • Hematocrit 09/15/2023 40.1  36.5 - 49.3 % Final   • MCV 09/15/2023 99 (H)  82 - 98 fL Final   • MCH 09/15/2023 32.9  26.8 - 34.3 pg Final   • MCHC 09/15/2023 33.4  31.4 - 37.4 g/dL Final   • RDW 09/15/2023 12.0  11.6 - 15.1 % Final   • MPV 09/15/2023 12.9 (H)  8.9 - 12.7 fL Final   • Platelets 03/03/9741 131 (L)  149 - 390 Thousands/uL Final   • nRBC 09/15/2023 0  /100 WBCs Final   • Neutrophils Relative 09/15/2023 43  43 - 75 % Final   • Immat GRANS % 09/15/2023 0  0 - 2 % Final   • Lymphocytes Relative 09/15/2023 42  14 - 44 % Final   • Monocytes Relative 09/15/2023 12  4 - 12 % Final   • Eosinophils Relative 09/15/2023 3  0 - 6 % Final   • Basophils Relative 09/15/2023 0  0 - 1 % Final   • Neutrophils Absolute 09/15/2023 0.99 (L)  1.85 - 7.62 Thousands/µL Final   • Immature Grans Absolute 09/15/2023 0.01  0.00 - 0.20 Thousand/uL Final   • Lymphocytes Absolute 09/15/2023 0.99  0.60 - 4.47 Thousands/µL Final   • Monocytes Absolute 09/15/2023 0.28  0.17 - 1.22 Thousand/µL Final   • Eosinophils Absolute 09/15/2023 0.07  0.00 - 0.61 Thousand/µL Final   • Basophils Absolute 09/15/2023 0.01  0.00 - 0.10 Thousands/µL Final   • Iron Saturation 09/15/2023 52 (H)  15 - 50 % Final   • TIBC 09/15/2023 287  250 - 450 ug/dL Final   • Iron 09/15/2023 148  50 - 212 ug/dL Final    Patients treated with metal-binding drugs (ie. Deferoxamine) may have depressed iron values. • UIBC 09/15/2023 139 (L)  155 - 355 ug/dL Final   • Ferritin 09/15/2023 159  24 - 336 ng/mL Final       Please note: This report has been generated by a voice recognition software system. Therefore there may be syntax, spelling, and/or grammatical errors. Please call if you have any questions.

## 2023-09-25 NOTE — PATIENT INSTRUCTIONS
[facility] Team  ACUITY [facility] AT [place] - [phone]    Your Team Members:  Advanced Practitioner:  Justina Trujillo PA-C  Oncology Nurse:   Dayna Conley RN ([phone]) M-F 8am - 4:30pm    Please answer Private and Unavailable Calls - this may be your team(s) contacting you.   If you have medical questions/concerns/issues - contact us either by (1) My Chart (2) [place]

## 2023-10-31 ENCOUNTER — RA CDI HCC (OUTPATIENT)
Dept: OTHER | Facility: HOSPITAL | Age: 33
End: 2023-10-31

## 2023-10-31 NOTE — PROGRESS NOTES
[address] [facility] coding opportunities          Chart Reviewed number of suggestions sent to Provider: 1   J45.20    Patients Insurance        Commercial Insurance: Liborio Mendoza

## 2023-11-09 ENCOUNTER — OFFICE VISIT (OUTPATIENT)
Dept: FAMILY MEDICINE CLINIC | Facility: CLINIC | Age: 33
End: 2023-11-09
Payer: COMMERCIAL

## 2023-11-09 VITALS
WEIGHT: 158.2 LBS | DIASTOLIC BLOOD PRESSURE: 74 MMHG | OXYGEN SATURATION: 99 % | HEIGHT: 72 IN | TEMPERATURE: 98.5 F | HEART RATE: 69 BPM | SYSTOLIC BLOOD PRESSURE: 124 MMHG | RESPIRATION RATE: 12 BRPM | BODY MASS INDEX: 21.43 KG/M2

## 2023-11-09 DIAGNOSIS — B35.6 TINEA CRURIS: ICD-10-CM

## 2023-11-09 DIAGNOSIS — Z00.00 ENCOUNTER FOR ANNUAL PHYSICAL EXAM: Primary | ICD-10-CM

## 2023-11-09 DIAGNOSIS — Z23 INFLUENZA VACCINE NEEDED: ICD-10-CM

## 2023-11-09 PROBLEM — K20.0 EE (EOSINOPHILIC ESOPHAGITIS): Status: ACTIVE | Noted: 2023-11-09

## 2023-11-09 PROCEDURE — 99395 PREV VISIT EST AGE 18-39: CPT | Performed by: FAMILY MEDICINE

## 2023-11-09 PROCEDURE — 90471 IMMUNIZATION ADMIN: CPT

## 2023-11-09 PROCEDURE — 90686 IIV4 VACC NO PRSV 0.5 ML IM: CPT

## 2023-11-09 NOTE — PATIENT INSTRUCTIONS
Looks well here today. Did review visit with hematology from September, sees them again in late January. Does have slip to redo blood work, September WBC 2.35 which was stable, hemoglobin 13.4, platelet count 245,879. Ferritin levels have been up to normal limits, in the past had done Venofer infusions. He should continue to see GI, repeat EGD showed improvement regarding esophageal eosinophils, continues on pantoprazole twice daily. Has not required albuterol recently, continues with routine exercise. Has not required Xyzal recently. Does get various rashes, inguinal rash improved with using OTC Lotrimin, continue with treatment as is, avoid over drying with towel, use blow dryer on low cool setting, change immediately after exercise. Exam today is ok. Had seen dermatology in the past.  Consider another trial ketoconazole, consider oral Diflucan course if does worsen and persist.      Immunization History   Administered Date(s) Administered    COVID-19 PFIZER VACCINE 0.3 ML IM 03/02/2021, 03/23/2021, 10/17/2023    INFLUENZA 11/07/2022    Influenza Quadrivalent Preservative Free 3 years and older IM 10/16/2014, 09/25/2017    Influenza Quadrivalent, 6-35 Months IM 10/07/2015, 10/03/2016    Influenza, injectable, quadrivalent, preservative free 0.5 mL 10/03/2019, 10/08/2020, 11/04/2021, 11/07/2022    Influenza, seasonal, injectable 10/03/2012, 10/09/2013    Tdap 10/27/2020       He does do yearly Flu shot. Tdap/tetanus shot is up to date  (done every 10 yrs for superficial cuts, every 5 yrs for deep wounds)  Covid vaccine rcvd      Was never a smoker    Continue to try to watch healthy diet, exercise routinely. We will see him again in 12 months, sooner as needed.

## 2023-11-09 NOTE — PROGRESS NOTES
BMI Counseling: Body mass index is 21.46 kg/m². The BMI is above normal. Nutrition recommendations include encouraging healthy choices of fruits and vegetables. Exercise recommendations include exercising 3-5 times per week. Rationale for BMI follow-up plan is due to patient being overweight or obese. FAMILY PRACTICE OFFICE VISIT  Pravin Fitzpatrick D.O. [address] [facility]  [address] [place] [address].  [place], [place], [facility]      NAME: Julianna Manzanares  AGE: 28 y.o. SEX: male  : 1990   MRN: [id number]    DATE: 2023  TIME: 11:45 AM    Assessment and Plan     Problem List Items Addressed This Visit       Tinea cruris    Relevant Medications    Butenafine HCl 1 % cream     Other Visit Diagnoses       Encounter for annual physical exam    -  Primary    Influenza vaccine needed        Relevant Orders    influenza vaccine, quadrivalent, 0.5 mL, preservative-free, for adult and pediatric patients 6 mos+ (AFLURIA, FLUARIX, FLULAVAL, FLUZONE) (Completed)            Patient Instructions   Looks well here today. Did review visit with hematology from September, sees them again in late January. Does have slip to redo blood work, September WBC 2.35 which was stable, hemoglobin 13.4, platelet count 177,408. Ferritin levels have been up to normal limits, in the past had done Venofer infusions. He should continue to see GI, repeat EGD showed improvement regarding esophageal eosinophils, continues on pantoprazole twice daily. Has not required albuterol recently, continues with routine exercise. Has not required Xyzal recently. Does get various rashes, inguinal rash improved with using OTC Lotrimin, continue with treatment as is, avoid over drying with towel, use blow dryer on low cool setting, change immediately after exercise. Exam today is ok.   Had seen dermatology in the past.  Consider another trial ketoconazole, consider oral Diflucan course if does worsen and persist.      Immunization History   Administered Date(s) Administered    COVID-19 PFIZER VACCINE 0.3 ML IM 03/02/2021, 03/23/2021, 10/17/2023    INFLUENZA 11/07/2022    Influenza Quadrivalent Preservative Free 3 years and older IM 10/16/2014, 09/25/2017    Influenza Quadrivalent, 6-35 Months IM 10/07/2015, 10/03/2016    Influenza, injectable, quadrivalent, preservative free 0.5 mL 10/03/2019, 10/08/2020, 11/04/2021, 11/07/2022    Influenza, seasonal, injectable 10/03/2012, 10/09/2013    Tdap 10/27/2020       He does do yearly Flu shot. Tdap/tetanus shot is up to date  (done every 10 yrs for superficial cuts, every 5 yrs for deep wounds)  Covid vaccine rcvd      Was never a smoker    Continue to try to watch healthy diet, exercise routinely. We will see him again in 12 months, sooner as needed. Chief Complaint     Chief Complaint   Patient presents with    Physical Exam       History of Present Maria T Vences is a 28y.o.-year-old male who is in for his yearly checkup, overall he has been feeling well, does continue to see GI along with hematology. Does exercise routinely. His only complaints here today involve skin rashes-has noted an ongoing intermittent inguinal rash bilaterally, started earlier this year, did use ketoconazole, saw dermatology, recently has been using over-the-counter Lotrimin with benefit. He does shower/change right after his routine exercise. Rash is currently much improved    Also does get occasional rash lateral to eyes along with on lids, occasionally on chest as before. Uses PPI twice daily, globus sensation improved    Blood counts have been relatively stable      Review of Systems   Review of Systems   Constitutional:  Negative for appetite change, fatigue, fever and unexpected weight change. HENT:  Negative for sore throat and trouble swallowing. Respiratory:  Negative for cough, chest tightness and shortness of breath.     Cardiovascular:  Negative for chest pain, palpitations and leg swelling. Gastrointestinal:  Negative for abdominal pain, blood in stool, nausea and vomiting. No acid reflux     No change in bowel   Genitourinary:  Negative for dysuria and hematuria. Neurological:  Negative for dizziness, syncope, light-headedness and headaches. Psychiatric/Behavioral:  Negative for behavioral problems and confusion. Active Problem List     Patient Active Problem List   Diagnosis    Mild intermittent asthma without complication    Allergic rhinitis    Gastroesophageal reflux disease     Globus sensation throat    Leukocytopenia ( WBC 2.75 )    Tinea cruris    Fungal rash of trunk    Iron deficiency    Leukopenia    EE (eosinophilic esophagitis)       Past Medical History:  Reviewed    Past Surgical History:  Reviewed    Family History:  Reviewed    Social History:  Reviewed    Objective     Vitals:    11/09/23 1044   BP: 124/74   BP Location: Left arm   Patient Position: Sitting   Cuff Size: Standard   Pulse: 69   Resp: 12   Temp: 98.5 °F (36.9 °C)   TempSrc: Temporal   SpO2: 99%   Weight: 71.8 kg (158 lb 3.2 oz)   Height: 6' (1.829 m)     Body mass index is 21.46 kg/m². BP Readings from Last 3 Encounters:   11/09/23 124/74   09/25/23 118/64   08/24/23 130/66       Wt Readings from Last 3 Encounters:   11/09/23 71.8 kg (158 lb 3.2 oz)   09/25/23 70.8 kg (156 lb)   07/26/23 71.5 kg (157 lb 11.2 oz)       Physical Exam  Constitutional:       General: He is not in acute distress. Appearance: Normal appearance. He is well-developed. He is not ill-appearing. Eyes:      General: No scleral icterus. Cardiovascular:      Rate and Rhythm: Normal rate and regular rhythm. Heart sounds: Normal heart sounds. No murmur heard. Pulmonary:      Effort: Pulmonary effort is normal. No respiratory distress. Breath sounds: Normal breath sounds. No wheezing, rhonchi or rales. Abdominal:      Tenderness: There is no abdominal tenderness. Musculoskeletal:      Right lower leg: No edema. Left lower leg: No edema. Lymphadenopathy:      Cervical: No cervical adenopathy. Skin:     Coloration: Skin is not jaundiced. Comments: No significant inguinal rash here today. Neurological:      General: No focal deficit present. Mental Status: He is alert and oriented to person, place, and time. Psychiatric:         Mood and Affect: Mood normal.         Behavior: Behavior normal.         ALLERGIES:  No Known Allergies    Current Medications     Current Outpatient Medications   Medication Sig Dispense Refill    Butenafine HCl 1 % cream Apply topically 2 (two) times a day      pantoprazole (PROTONIX) 40 mg tablet TAKE ONE TABLET BY MOUTH TWICE A  tablet 2    albuterol (Ventolin HFA) 90 mcg/act inhaler Inhale 1-2 puffs every 6 (six) hours as needed for wheezing (Patient not taking: Reported on 11/9/2023) 8 g 0    ketoconazole (NIZORAL) 2 % cream Apply topically 2 (two) times a day (Patient not taking: Reported on 11/9/2023) 15 g 0    levocetirizine (XYZAL) 5 MG tablet Take 1 tablet (5 mg total) by mouth every evening ( for allergies ) (Patient not taking: Reported on 11/9/2023) 90 tablet 1    triamcinolone (KENALOG) 0.1 % cream Apply topically 2 (two) times a day (Patient not taking: Reported on 11/9/2023) 30 g 0     No current facility-administered medications for this visit.             Orders Placed This Encounter   Procedures    influenza vaccine, quadrivalent, 0.5 mL, preservative-free, for adult and pediatric patients 6 mos+ ([place], [facility], [address] [facility], [place])         Scooby Zimmerman DO

## 2024-01-17 ENCOUNTER — APPOINTMENT (OUTPATIENT)
Dept: LAB | Facility: CLINIC | Age: 34
End: 2024-01-17
Payer: COMMERCIAL

## 2024-01-17 DIAGNOSIS — E61.1 IRON DEFICIENCY: ICD-10-CM

## 2024-01-17 DIAGNOSIS — K21.9 GASTROESOPHAGEAL REFLUX DISEASE WITHOUT ESOPHAGITIS: ICD-10-CM

## 2024-01-17 DIAGNOSIS — D72.819 LEUKOPENIA, UNSPECIFIED TYPE: ICD-10-CM

## 2024-01-17 DIAGNOSIS — D69.6 THROMBOCYTOPENIA (HCC): ICD-10-CM

## 2024-01-17 LAB
ALBUMIN SERPL BCP-MCNC: 4.5 G/DL (ref 3.5–5)
ALP SERPL-CCNC: 49 U/L (ref 34–104)
ALT SERPL W P-5'-P-CCNC: 9 U/L (ref 7–52)
ANION GAP SERPL CALCULATED.3IONS-SCNC: 4 MMOL/L
AST SERPL W P-5'-P-CCNC: 17 U/L (ref 13–39)
BASOPHILS # BLD AUTO: 0.01 THOUSANDS/ÂΜL (ref 0–0.1)
BASOPHILS NFR BLD AUTO: 0 % (ref 0–1)
BILIRUB SERPL-MCNC: 0.67 MG/DL (ref 0.2–1)
BUN SERPL-MCNC: 19 MG/DL (ref 5–25)
CALCIUM SERPL-MCNC: 9.1 MG/DL (ref 8.4–10.2)
CHLORIDE SERPL-SCNC: 103 MMOL/L (ref 96–108)
CO2 SERPL-SCNC: 33 MMOL/L (ref 21–32)
CREAT SERPL-MCNC: 0.83 MG/DL (ref 0.6–1.3)
EOSINOPHIL # BLD AUTO: 0.1 THOUSAND/ÂΜL (ref 0–0.61)
EOSINOPHIL NFR BLD AUTO: 4 % (ref 0–6)
ERYTHROCYTE [DISTWIDTH] IN BLOOD BY AUTOMATED COUNT: 11.6 % (ref 11.6–15.1)
FERRITIN SERPL-MCNC: 157 NG/ML (ref 24–336)
GFR SERPL CREATININE-BSD FRML MDRD: 114 ML/MIN/1.73SQ M
GLUCOSE P FAST SERPL-MCNC: 91 MG/DL (ref 65–99)
HCT VFR BLD AUTO: 40.2 % (ref 36.5–49.3)
HGB BLD-MCNC: 13.6 G/DL (ref 12–17)
IMM GRANULOCYTES # BLD AUTO: 0 THOUSAND/UL (ref 0–0.2)
IMM GRANULOCYTES NFR BLD AUTO: 0 % (ref 0–2)
IRON SATN MFR SERPL: 44 % (ref 15–50)
IRON SERPL-MCNC: 134 UG/DL (ref 50–212)
LYMPHOCYTES # BLD AUTO: 0.97 THOUSANDS/ÂΜL (ref 0.6–4.47)
LYMPHOCYTES NFR BLD AUTO: 43 % (ref 14–44)
MAGNESIUM SERPL-MCNC: 1.9 MG/DL (ref 1.9–2.7)
MCH RBC QN AUTO: 32.9 PG (ref 26.8–34.3)
MCHC RBC AUTO-ENTMCNC: 33.8 G/DL (ref 31.4–37.4)
MCV RBC AUTO: 97 FL (ref 82–98)
MONOCYTES # BLD AUTO: 0.29 THOUSAND/ÂΜL (ref 0.17–1.22)
MONOCYTES NFR BLD AUTO: 13 % (ref 4–12)
NEUTROPHILS # BLD AUTO: 0.9 THOUSANDS/ÂΜL (ref 1.85–7.62)
NEUTS SEG NFR BLD AUTO: 40 % (ref 43–75)
NRBC BLD AUTO-RTO: 0 /100 WBCS
PLATELET # BLD AUTO: 134 THOUSANDS/UL (ref 149–390)
PMV BLD AUTO: 11.9 FL (ref 8.9–12.7)
POTASSIUM SERPL-SCNC: 4 MMOL/L (ref 3.5–5.3)
PROT SERPL-MCNC: 6.7 G/DL (ref 6.4–8.4)
RBC # BLD AUTO: 4.13 MILLION/UL (ref 3.88–5.62)
SODIUM SERPL-SCNC: 140 MMOL/L (ref 135–147)
TIBC SERPL-MCNC: 305 UG/DL (ref 250–450)
UIBC SERPL-MCNC: 171 UG/DL (ref 155–355)
WBC # BLD AUTO: 2.27 THOUSAND/UL (ref 4.31–10.16)

## 2024-01-17 PROCEDURE — 36415 COLL VENOUS BLD VENIPUNCTURE: CPT

## 2024-01-17 PROCEDURE — 82728 ASSAY OF FERRITIN: CPT

## 2024-01-17 PROCEDURE — 83735 ASSAY OF MAGNESIUM: CPT

## 2024-01-17 PROCEDURE — 85025 COMPLETE CBC W/AUTO DIFF WBC: CPT

## 2024-01-17 PROCEDURE — 83550 IRON BINDING TEST: CPT

## 2024-01-17 PROCEDURE — 83540 ASSAY OF IRON: CPT

## 2024-01-17 PROCEDURE — 80053 COMPREHEN METABOLIC PANEL: CPT

## 2024-01-25 ENCOUNTER — OFFICE VISIT (OUTPATIENT)
Dept: HEMATOLOGY ONCOLOGY | Facility: CLINIC | Age: 34
End: 2024-01-25
Payer: COMMERCIAL

## 2024-01-25 VITALS
HEIGHT: 73 IN | HEART RATE: 73 BPM | RESPIRATION RATE: 18 BRPM | OXYGEN SATURATION: 99 % | TEMPERATURE: 97.5 F | BODY MASS INDEX: 21.54 KG/M2 | DIASTOLIC BLOOD PRESSURE: 68 MMHG | SYSTOLIC BLOOD PRESSURE: 118 MMHG | WEIGHT: 162.5 LBS

## 2024-01-25 DIAGNOSIS — D69.6 THROMBOCYTOPENIA (HCC): ICD-10-CM

## 2024-01-25 DIAGNOSIS — Z82.69 FAMILY HISTORY OF SYSTEMIC LUPUS ERYTHEMATOSUS (SLE) IN MOTHER: ICD-10-CM

## 2024-01-25 DIAGNOSIS — E61.1 IRON DEFICIENCY: Primary | ICD-10-CM

## 2024-01-25 DIAGNOSIS — D72.819 LEUKOPENIA, UNSPECIFIED TYPE: ICD-10-CM

## 2024-01-25 PROCEDURE — 99215 OFFICE O/P EST HI 40 MIN: CPT | Performed by: PHYSICIAN ASSISTANT

## 2024-01-25 NOTE — PROGRESS NOTES
[address]  [facility] HEMATOLOGY ONCOLOGY SPECIALISTS [place]  [address]  [facility] [address]  Hematology Ambulatory Follow-Up  Estrada Gusman, 1990, [id number]  1/25/2024      Assessment and Plan   1. Leukopenia, unspecified type; 2. Thrombocytopenia (HCC); 3. Family history of systemic lupus erythematosus (SLE) in mother  This is a 33-year-old male with history of longstanding neutropenia.  Patient also has borderline thrombocytopenia with the present platelet count in the 1 30,000/unit liter.  Patient is significant family history of lupus in his mother who also suffers from other rheumatologic diagnoses including were not phenomena as well as Sojourn syndrome.  Patient and I discussed laboratory assessment.  We discussed potentially undergoing evaluation for abnormalities with T cells.  If T-cell rearrangement is found, patient likely has an underlying or provoking rheumatologic diagnoses.  Inflammatory markers as well as SCAR have been completed.  Patient will undergo ultrasound of the abdomen to rule out splenomegaly(Felty syndrome.)    Regardless, I discussed with the patient that if there is progressive neutropenia with an ANC at 500/unit liter, bone marrow biopsy would be indicated however at this time since the patient is asymptomatic, I do not believe that is necessary especially with a normal hemoglobin and very mild thrombocytopenia.    Patient will follow-up in 4 months with blood work prior.  Patient was in agreement with her plan above.  Patient will contact the office if he develops any symptoms.  Patient has no B symptoms at this time.    - T CELL PANEL(GAMMA BETA); Future  - CBC and differential; Future  - Comprehensive metabolic panel; Future  - RF Screen w/ Reflex to Titer; Future  - SCAR w/Reflex; Future  - Sedimentation rate, automated; Future  - C-reactive protein; Future  - US abdomen complete; Future    4. Iron deficiency  Patient had history of GERD, iron deficiency  likely secondary to this.  Iron studies have been stable since treatment.     Will recheck in 1 year unless hemoglobin or RBC indices demonstrate need for evaluation sooner.          The patient is scheduled for follow-up in approximately 4 months.     Patient voiced agreement and understanding to the above.   Patient advised to call the Hematology/Oncology office with any questions and concerns regarding the above.    Barrier(s) to care: None  The patient is able to self care.    Lynn Heck PA-C  Medical Oncology/Hematology  [facility]    Subjective     Chief Complaint   Patient presents with    Follow-up       History of present illness:   This is a 33-year-old male with past medical history of seasonal allergies and GERD- secondary to lower esophageal sphincter dysfunction diagnosed in 2021 who presents to the hematology clinic for evaluation of iron deficiency anemia and leukopenia.     Leukopenia:  No symptoms of leukopenia or B symptoms.  Patient does have some recurring fungal infections however he is an avid hiker and suffers from recurrent athlete's foot, occasionally will spread to the groin or to the trunk.  No social history contributory to viral etiology however, patient's mother required blood transfusions in the 80s.  She subsequently was diagnosed with hepatitis and treated accordingly.  No other details are known about this issue.  Blood transfusion occurred prior to patient's birth.  Trends:  8/30/2021 WBC 2.96, hemoglobin 14.9, MCV 98, platelets 132, ANC 1.04  8/3/2022 WBC 3.67, hemoglobin 13.9, MCV 98, platelets 164, no differential  5/18/2023 B12 = 636, folate = 21.4, WBC 2.3, hemoglobin 13.9, MCV 96, platelets 149, ANC 0.94 differential, within normal limits  6/23/2023 WBC 2.94, hemoglobin 14.1, platelet count 139, ANC 1.2  Negative chronic hepatitis, negative HIV  Recheck within normal limits, copper within normal limits, MMA within normal limits  Flow  cytometry negative  9/15/2023 WBC 2.35, hemoglobin 13.4, MCV 99, platelet count 131       Iron deficiency anemia:  Never known issue prior to recent blood testing which was completed through GI office.  Endoscopy without signs or symptoms of chronic blood loss anemia, no history of colonoscopy.  Patient restricts diet with limited beef and pork.  However patient is conscious of this decision and takes a multivitamin regularly.  Patient has sleep disturbances, occasional fatigue.  Patient mountain bikes and hikes frequently.  Family history noncontributory/limited with no known extended family on mother side and paternal grandfather passing early but of unknown causes.  See trends above.  Ferritin = 9, TIBC 433, iron saturation 25%  Fecal occult blood testing negative.  7/13 through 8/24/2023: Venofer 200 mg x 7 doses.  9/15/2023 WBC 2.35, hemoglobin 13.4, MCV 99, platelet count 131  Ferritin = 159    1/17/2024: WBC 2.2, hemoglobin 13.6, MCV 97, platelet count 134   ANC 0.90, rest of absolute differential WNL.   CMP and electrolyte abnormalities within normal limits or not clinically significant with an elevated CO2.   Iron saturation 44%, TIBC 305, ferritin 157    Interval history: No symptoms.  Patient continues to hike.  No fatigue, no hand stiffness, arthralgias, dry mouth, rashes.  No need for antibiotic since last visit.    Of note mother with severe rheumatologic illness: Lupus, Sojourn syndrome, Raynaud's phenomenon to name a few that the patient is aware of.  Patient's mother was diagnosed in late 50s.     Review of Systems   Constitutional:  Negative for activity change, appetite change, fatigue and fever.   HENT:  Negative for nosebleeds.    Respiratory:  Negative for cough, choking and shortness of breath.    Cardiovascular:  Negative for chest pain, palpitations and leg swelling.   Gastrointestinal:  Negative for abdominal distention, abdominal pain, anal bleeding, blood in stool, constipation, diarrhea,  nausea and vomiting.   Endocrine: Negative for cold intolerance.   Genitourinary:  Negative for hematuria.   Musculoskeletal:  Negative for myalgias.   Skin:  Negative for color change, pallor and rash.   Allergic/Immunologic: Negative for immunocompromised state.   Neurological:  Negative for headaches.   Hematological:  Negative for adenopathy. Does not bruise/bleed easily.   All other systems reviewed and are negative.      Patient Active Problem List   Diagnosis    Mild intermittent asthma without complication    Allergic rhinitis    Gastroesophageal reflux disease     Globus sensation throat    Leukocytopenia ( WBC 2.75 )    Tinea cruris    Fungal rash of trunk    Iron deficiency    Leukopenia    EE (eosinophilic esophagitis)     Past Medical History:   Diagnosis Date    Asthma     GERD (gastroesophageal reflux disease)     Iron deficiency anemia      Past Surgical History:   Procedure Laterality Date    WISDOM TOOTH EXTRACTION       Family History   Problem Relation Age of Onset    Arthritis Mother     Colon polyps Father     Hypertension Father     Stroke Maternal Grandfather      Social History     Socioeconomic History    Marital status: Single     Spouse name: Not on file    Number of children: Not on file    Years of education: Not on file    Highest education level: Not on file   Occupational History    Occupation: enemplyed   Tobacco Use    Smoking status: Never     Passive exposure: Past (family members smoked over 10 years ago)    Smokeless tobacco: Never   Vaping Use    Vaping status: Never Used   Substance and Sexual Activity    Alcohol use: Never    Drug use: Never    Sexual activity: Not Currently     Partners: Female   Other Topics Concern    Not on file   Social History Narrative    Not on file     Social Determinants of Health     Financial Resource Strain: Not on file   Food Insecurity: Not on file   Transportation Needs: Not on file   Physical Activity: Not on file   Stress: Not on file  "  Social Connections: Not on file   Intimate Partner Violence: Not on file   Housing Stability: Not on file       Current Outpatient Medications:     Butenafine HCl 1 % cream, Apply topically 2 (two) times a day, Disp: , Rfl:     pantoprazole (PROTONIX) 40 mg tablet, TAKE ONE TABLET BY MOUTH TWICE A DAY, Disp: 180 tablet, Rfl: 2    albuterol (Ventolin HFA) 90 mcg/act inhaler, Inhale 1-2 puffs every 6 (six) hours as needed for wheezing (Patient not taking: Reported on 11/9/2023), Disp: 8 g, Rfl: 0    ketoconazole (NIZORAL) 2 % cream, Apply topically 2 (two) times a day (Patient not taking: Reported on 11/9/2023), Disp: 15 g, Rfl: 0    levocetirizine (XYZAL) 5 MG tablet, Take 1 tablet (5 mg total) by mouth every evening ( for allergies ) (Patient not taking: Reported on 11/9/2023), Disp: 90 tablet, Rfl: 1    triamcinolone (KENALOG) 0.1 % cream, Apply topically 2 (two) times a day (Patient not taking: Reported on 11/9/2023), Disp: 30 g, Rfl: 0  No Known Allergies    Objective   /68 (BP Location: Left arm)   Pulse 73   Temp 97.5 °F (36.4 °C) (Tympanic)   Resp 18   Ht 6' 1\" (1.854 m)   Wt 73.7 kg (162 lb 8 oz)   SpO2 99%   BMI 21.44 kg/m²    Physical Exam  Constitutional:       General: He is not in acute distress.     Appearance: He is well-developed.   HENT:      Head: Normocephalic and atraumatic.      Right Ear: External ear normal.      Left Ear: External ear normal.      Nose: Nose normal.   Eyes:      General: No scleral icterus.     Conjunctiva/sclera: Conjunctivae normal.   Cardiovascular:      Rate and Rhythm: Normal rate.   Pulmonary:      Effort: No respiratory distress.   Abdominal:      General: There is no distension.      Palpations: Abdomen is soft.   Skin:     Findings: No rash (on exposed skin.).   Neurological:      Mental Status: He is alert and oriented to person, place, and time.   Psychiatric:         Thought Content: Thought content normal.         Result Review  Labs:  Appointment " on 01/17/2024   Component Date Value Ref Range Status    Magnesium 01/17/2024 1.9  1.9 - 2.7 mg/dL Final    Sodium 01/17/2024 140  135 - 147 mmol/L Final    Potassium 01/17/2024 4.0  3.5 - 5.3 mmol/L Final    Chloride 01/17/2024 103  96 - 108 mmol/L Final    CO2 01/17/2024 33 (H)  21 - 32 mmol/L Final    ANION GAP 01/17/2024 4  mmol/L Final    BUN 01/17/2024 19  5 - 25 mg/dL Final    Creatinine 01/17/2024 0.83  0.60 - 1.30 mg/dL Final    Standardized to IDMS reference method    Glucose, Fasting 01/17/2024 91  65 - 99 mg/dL Final    Calcium 01/17/2024 9.1  8.4 - 10.2 mg/dL Final    AST 01/17/2024 17  13 - 39 U/L Final    ALT 01/17/2024 9  7 - 52 U/L Final    Specimen collection should occur prior to Sulfasalazine administration due to the potential for falsely depressed results.     Alkaline Phosphatase 01/17/2024 49  34 - 104 U/L Final    Total Protein 01/17/2024 6.7  6.4 - 8.4 g/dL Final    Albumin 01/17/2024 4.5  3.5 - 5.0 g/dL Final    Total Bilirubin 01/17/2024 0.67  0.20 - 1.00 mg/dL Final    Use of this assay is not recommended for patients undergoing treatment with eltrombopag due to the potential for falsely elevated results.  N-acetyl-p-benzoquinone imine (metabolite of Acetaminophen) will generate erroneously low results in samples for patients that have taken an overdose of Acetaminophen.    eGFR 01/17/2024 114  ml/min/1.73sq m Final    WBC 01/17/2024 2.27 (L)  4.31 - 10.16 Thousand/uL Final    RBC 01/17/2024 4.13  3.88 - 5.62 Million/uL Final    Hemoglobin 01/17/2024 13.6  12.0 - 17.0 g/dL Final    Hematocrit 01/17/2024 40.2  36.5 - 49.3 % Final    MCV 01/17/2024 97  82 - 98 fL Final    MCH 01/17/2024 32.9  26.8 - 34.3 pg Final    MCHC 01/17/2024 33.8  31.4 - 37.4 g/dL Final    RDW 01/17/2024 11.6  11.6 - 15.1 % Final    MPV 01/17/2024 11.9  8.9 - 12.7 fL Final    Platelets 01/17/2024 134 (L)  149 - 390 Thousands/uL Final    nRBC 01/17/2024 0  /100 WBCs Final    Neutrophils Relative 01/17/2024 40 (L)  43  - 75 % Final    Immat GRANS % 01/17/2024 0  0 - 2 % Final    Lymphocytes Relative 01/17/2024 43  14 - 44 % Final    Monocytes Relative 01/17/2024 13 (H)  4 - 12 % Final    Eosinophils Relative 01/17/2024 4  0 - 6 % Final    Basophils Relative 01/17/2024 0  0 - 1 % Final    Neutrophils Absolute 01/17/2024 0.90 (L)  1.85 - 7.62 Thousands/µL Final    Immature Grans Absolute 01/17/2024 0.00  0.00 - 0.20 Thousand/uL Final    Lymphocytes Absolute 01/17/2024 0.97  0.60 - 4.47 Thousands/µL Final    Monocytes Absolute 01/17/2024 0.29  0.17 - 1.22 Thousand/µL Final    Eosinophils Absolute 01/17/2024 0.10  0.00 - 0.61 Thousand/µL Final    Basophils Absolute 01/17/2024 0.01  0.00 - 0.10 Thousands/µL Final    Iron Saturation 01/17/2024 44  15 - 50 % Final    TIBC 01/17/2024 305  250 - 450 ug/dL Final    Iron 01/17/2024 134  50 - 212 ug/dL Final    Patients treated with metal-binding drugs (ie. Deferoxamine) may have depressed iron values.    UIBC 01/17/2024 171  155 - 355 ug/dL Final    Ferritin 01/17/2024 157  24 - 336 ng/mL Final       Please note:  This report has been generated by a voice recognition software system. Therefore there may be syntax, spelling, and/or grammatical errors. Please call if you have any questions.

## 2024-01-25 NOTE — PATIENT INSTRUCTIONS
[facility] Medical Oncology and Hematology Team  [place] - [phone]    Your Team Members:  Advanced Practitioner:  Lynn Heck PA-C  Oncology Nurse:   SUNG Feliciano ([phone]) M-F 8am - 4:30pm    Please answer Private and Unavailable Calls - this may be your team(s) contacting you.  If you have medical questions/concerns/issues - contact us either by (1) My Chart (2) [place]

## 2024-04-25 ENCOUNTER — HOSPITAL ENCOUNTER (OUTPATIENT)
Dept: ULTRASOUND IMAGING | Facility: MEDICAL CENTER | Age: 34
Discharge: HOME | End: 2024-04-25
Payer: COMMERCIAL

## 2024-04-25 DIAGNOSIS — Z82.69 FAMILY HISTORY OF SYSTEMIC LUPUS ERYTHEMATOSUS (SLE) IN MOTHER: ICD-10-CM

## 2024-04-25 DIAGNOSIS — D72.819 LEUKOPENIA, UNSPECIFIED TYPE: ICD-10-CM

## 2024-04-25 DIAGNOSIS — D69.6 THROMBOCYTOPENIA (HCC): ICD-10-CM

## 2024-04-25 PROCEDURE — 76700 US EXAM ABDOM COMPLETE: CPT

## 2024-05-03 ENCOUNTER — OFFICE VISIT (OUTPATIENT)
Dept: FAMILY MEDICINE CLINIC | Facility: CLINIC | Age: 34
End: 2024-05-03
Payer: COMMERCIAL

## 2024-05-03 VITALS
RESPIRATION RATE: 20 BRPM | HEIGHT: 73 IN | OXYGEN SATURATION: 98 % | SYSTOLIC BLOOD PRESSURE: 128 MMHG | DIASTOLIC BLOOD PRESSURE: 68 MMHG | HEART RATE: 70 BPM | BODY MASS INDEX: 20.46 KG/M2 | WEIGHT: 154.4 LBS | TEMPERATURE: 96 F

## 2024-05-03 DIAGNOSIS — L74.0 HEAT RASH: ICD-10-CM

## 2024-05-03 DIAGNOSIS — B35.6 TINEA CRURIS: Primary | ICD-10-CM

## 2024-05-03 PROCEDURE — 99213 OFFICE O/P EST LOW 20 MIN: CPT | Performed by: FAMILY MEDICINE

## 2024-05-03 RX ORDER — FLUCONAZOLE 150 MG/1
150 TABLET ORAL WEEKLY
Qty: 2 TABLET | Refills: 0 | Status: SHIPPED | OUTPATIENT
Start: 2024-05-03 | End: 2024-05-11

## 2024-05-03 NOTE — PATIENT INSTRUCTIONS
1. Tinea cruris  -     fluconazole (DIFLUCAN) 150 mg tablet; Take 1 tablet (150 mg total) by mouth once a week for 2 doses Use when groin rash re occurs    2. Heat rash

## 2024-05-03 NOTE — PROGRESS NOTES
Answers submitted by the patient for this visit:  Rash Questionnaire (Submitted on 5/2/2024)  Chief Complaint: Rash  Chronicity: recurrent  Onset: yesterday  Progression since onset: rapidly worsening  Affected locations: chest, torso, back, abdomen  Characteristics: burning, itchiness  Exposed to: nothing  anorexia: No  congestion: No  cough: No  diarrhea: No  eye pain: No  facial edema: No  fatigue: No  fever: No  joint pain: No  nail changes: No  rhinorrhea: No  shortness of breath: No  sore throat: No  vomiting: No    Assessment/Plan:       Problem List Items Addressed This Visit          Musculoskeletal and Integument    Tinea cruris - Primary    Relevant Medications    fluconazole (DIFLUCAN) 150 mg tablet     Other Visit Diagnoses       Heat rash                1. Heat rash  Resolved, patient will call if returns    2. Tinea cruris  Given recurrent rash not fully resolving with topical will do trial of oral diflucan.    - fluconazole (DIFLUCAN) 150 mg tablet; Take 1 tablet (150 mg total) by mouth once a week for 2 doses Use when groin rash re occurs  Dispense: 2 tablet; Refill: 0    Subjective:      Patient ID: Estrada Gusman is a 34 y.o. male.    Rash  This is a recurrent problem. The current episode started yesterday. The problem has been rapidly worsening since onset. The affected locations include the chest, torso, back and abdomen. The rash is characterized by burning and itchiness. He was exposed to nothing. Pertinent negatives include no anorexia, congestion, cough, diarrhea, facial edema, fatigue, fever, joint pain, rhinorrhea, shortness of breath, sore throat or vomiting.       34 year old presnting for reccurent groin rash, and papular rash on abdomen and back for one day.    The rash on his abdomen and back begand yesterday after removing shingles, it has since resolved.    Reviewed on pictures on patients phone.    Reports groin rash is reoccruing does resolve with butefaine but will reoccur a  "few months later, reports started after staying at [facility] in [place]   The following portions of the patient's history were reviewed and updated as appropriate: allergies, current medications, past family history, past medical history, past social history, past surgical history and problem list.      Current Outpatient Medications:     fluconazole (DIFLUCAN) 150 mg tablet, Take 1 tablet (150 mg total) by mouth once a week for 2 doses Use when groin rash re occurs, Disp: 2 tablet, Rfl: 0    albuterol (Ventolin HFA) 90 mcg/act inhaler, Inhale 1-2 puffs every 6 (six) hours as needed for wheezing (Patient not taking: Reported on 11/9/2023), Disp: 8 g, Rfl: 0    Butenafine HCl 1 % cream, Apply topically 2 (two) times a day, Disp: , Rfl:     ketoconazole (NIZORAL) 2 % cream, Apply topically 2 (two) times a day (Patient not taking: Reported on 11/9/2023), Disp: 15 g, Rfl: 0    levocetirizine (XYZAL) 5 MG tablet, Take 1 tablet (5 mg total) by mouth every evening ( for allergies ) (Patient not taking: Reported on 11/9/2023), Disp: 90 tablet, Rfl: 1    pantoprazole (PROTONIX) 40 mg tablet, TAKE ONE TABLET BY MOUTH TWICE A DAY, Disp: 180 tablet, Rfl: 2    triamcinolone (KENALOG) 0.1 % cream, Apply topically 2 (two) times a day (Patient not taking: Reported on 11/9/2023), Disp: 30 g, Rfl: 0     Review of Systems   Constitutional:  Negative for fatigue and fever.   HENT:  Negative for congestion, rhinorrhea and sore throat.    Eyes:  Negative for pain.   Respiratory:  Negative for cough and shortness of breath.    Gastrointestinal:  Negative for anorexia, diarrhea and vomiting.   Musculoskeletal:  Negative for joint pain.   Skin:  Positive for rash.         Objective:      /68 (BP Location: Right arm, Patient Position: Sitting, Cuff Size: Standard)   Pulse 70   Temp (!) 96 °F (35.6 °C) (Tympanic)   Resp 20   Ht 6' 1\" (1.854 m)   Wt 70 kg (154 lb 6.4 oz)   SpO2 98%   BMI 20.37 kg/m²          Physical " Exam  Constitutional:       Appearance: Normal appearance.   Cardiovascular:      Rate and Rhythm: Normal rate and regular rhythm.      Pulses: Normal pulses.      Heart sounds: Normal heart sounds.   Pulmonary:      Effort: Pulmonary effort is normal.   Skin:     General: Skin is warm.      Comments: Mild erythema and irritation in chest   Neurological:      Mental Status: He is alert.           Seng Miller MD   no nose bleeding/no gum bleeding

## 2024-05-24 ENCOUNTER — APPOINTMENT (OUTPATIENT)
Dept: LAB | Facility: CLINIC | Age: 34
End: 2024-05-24
Payer: COMMERCIAL

## 2024-05-24 DIAGNOSIS — Z82.69 FAMILY HISTORY OF SYSTEMIC LUPUS ERYTHEMATOSUS (SLE) IN MOTHER: ICD-10-CM

## 2024-05-24 DIAGNOSIS — D72.819 LEUKOPENIA, UNSPECIFIED TYPE: ICD-10-CM

## 2024-05-24 LAB
ALBUMIN SERPL BCP-MCNC: 4.5 G/DL (ref 3.5–5)
ALP SERPL-CCNC: 52 U/L (ref 34–104)
ALT SERPL W P-5'-P-CCNC: 9 U/L (ref 7–52)
ANA SER QL IA: NEGATIVE
ANION GAP SERPL CALCULATED.3IONS-SCNC: 7 MMOL/L (ref 4–13)
AST SERPL W P-5'-P-CCNC: 21 U/L (ref 13–39)
BASOPHILS # BLD AUTO: 0.01 THOUSANDS/ÂΜL (ref 0–0.1)
BASOPHILS NFR BLD AUTO: 1 % (ref 0–1)
BILIRUB SERPL-MCNC: 0.77 MG/DL (ref 0.2–1)
BUN SERPL-MCNC: 20 MG/DL (ref 5–25)
CALCIUM SERPL-MCNC: 9.2 MG/DL (ref 8.4–10.2)
CHLORIDE SERPL-SCNC: 103 MMOL/L (ref 96–108)
CO2 SERPL-SCNC: 31 MMOL/L (ref 21–32)
CREAT SERPL-MCNC: 0.77 MG/DL (ref 0.6–1.3)
CRP SERPL QL: <1 MG/L
EOSINOPHIL # BLD AUTO: 0.06 THOUSAND/ÂΜL (ref 0–0.61)
EOSINOPHIL NFR BLD AUTO: 3 % (ref 0–6)
ERYTHROCYTE [DISTWIDTH] IN BLOOD BY AUTOMATED COUNT: 11.7 % (ref 11.6–15.1)
ERYTHROCYTE [SEDIMENTATION RATE] IN BLOOD: <1 MM/HOUR (ref 0–14)
GFR SERPL CREATININE-BSD FRML MDRD: 117 ML/MIN/1.73SQ M
GLUCOSE P FAST SERPL-MCNC: 92 MG/DL (ref 65–99)
HCT VFR BLD AUTO: 40.4 % (ref 36.5–49.3)
HGB BLD-MCNC: 13.5 G/DL (ref 12–17)
IMM GRANULOCYTES # BLD AUTO: 0.01 THOUSAND/UL (ref 0–0.2)
IMM GRANULOCYTES NFR BLD AUTO: 1 % (ref 0–2)
LYMPHOCYTES # BLD AUTO: 0.91 THOUSANDS/ÂΜL (ref 0.6–4.47)
LYMPHOCYTES NFR BLD AUTO: 42 % (ref 14–44)
MCH RBC QN AUTO: 33.2 PG (ref 26.8–34.3)
MCHC RBC AUTO-ENTMCNC: 33.4 G/DL (ref 31.4–37.4)
MCV RBC AUTO: 99 FL (ref 82–98)
MONOCYTES # BLD AUTO: 0.28 THOUSAND/ÂΜL (ref 0.17–1.22)
MONOCYTES NFR BLD AUTO: 13 % (ref 4–12)
NEUTROPHILS # BLD AUTO: 0.84 THOUSANDS/ÂΜL (ref 1.85–7.62)
NEUTS SEG NFR BLD AUTO: 40 % (ref 43–75)
NRBC BLD AUTO-RTO: 0 /100 WBCS
PLATELET # BLD AUTO: 147 THOUSANDS/UL (ref 149–390)
PMV BLD AUTO: 12.2 FL (ref 8.9–12.7)
POTASSIUM SERPL-SCNC: 4.3 MMOL/L (ref 3.5–5.3)
PROT SERPL-MCNC: 6.9 G/DL (ref 6.4–8.4)
RBC # BLD AUTO: 4.07 MILLION/UL (ref 3.88–5.62)
SODIUM SERPL-SCNC: 141 MMOL/L (ref 135–147)
WBC # BLD AUTO: 2.11 THOUSAND/UL (ref 4.31–10.16)

## 2024-05-24 PROCEDURE — 81342 TRG GENE REARRANGEMENT ANAL: CPT

## 2024-05-24 PROCEDURE — 88184 FLOWCYTOMETRY/ TC 1 MARKER: CPT

## 2024-05-24 PROCEDURE — 85025 COMPLETE CBC W/AUTO DIFF WBC: CPT

## 2024-05-24 PROCEDURE — 80053 COMPREHEN METABOLIC PANEL: CPT

## 2024-05-24 PROCEDURE — 81340 TRB@ GENE REARRANGE AMPLIFY: CPT

## 2024-05-24 PROCEDURE — 36415 COLL VENOUS BLD VENIPUNCTURE: CPT

## 2024-05-24 PROCEDURE — 88185 FLOWCYTOMETRY/TC ADD-ON: CPT | Performed by: STUDENT IN AN ORGANIZED HEALTH CARE EDUCATION/TRAINING PROGRAM

## 2024-05-24 PROCEDURE — 86430 RHEUMATOID FACTOR TEST QUAL: CPT

## 2024-05-24 PROCEDURE — 85652 RBC SED RATE AUTOMATED: CPT

## 2024-05-24 PROCEDURE — 86038 ANTINUCLEAR ANTIBODIES: CPT

## 2024-05-24 PROCEDURE — 86140 C-REACTIVE PROTEIN: CPT

## 2024-05-27 LAB — RHEUMATOID FACT SER QL LA: NEGATIVE

## 2024-05-28 DIAGNOSIS — K21.9 GASTROESOPHAGEAL REFLUX DISEASE WITHOUT ESOPHAGITIS: ICD-10-CM

## 2024-05-28 LAB — SCAN RESULT: NORMAL

## 2024-05-30 ENCOUNTER — OFFICE VISIT (OUTPATIENT)
Dept: HEMATOLOGY ONCOLOGY | Facility: CLINIC | Age: 34
End: 2024-05-30
Payer: COMMERCIAL

## 2024-05-30 VITALS
OXYGEN SATURATION: 99 % | HEART RATE: 71 BPM | DIASTOLIC BLOOD PRESSURE: 70 MMHG | RESPIRATION RATE: 18 BRPM | TEMPERATURE: 98.3 F | BODY MASS INDEX: 20.37 KG/M2 | SYSTOLIC BLOOD PRESSURE: 118 MMHG | HEIGHT: 73 IN

## 2024-05-30 DIAGNOSIS — D70.9 NEUTROPENIA, UNSPECIFIED TYPE (HCC): Primary | ICD-10-CM

## 2024-05-30 DIAGNOSIS — E61.1 IRON DEFICIENCY: ICD-10-CM

## 2024-05-30 DIAGNOSIS — D69.6 THROMBOCYTOPENIA (HCC): ICD-10-CM

## 2024-05-30 DIAGNOSIS — Z82.69 FAMILY HISTORY OF SYSTEMIC LUPUS ERYTHEMATOSUS (SLE) IN MOTHER: ICD-10-CM

## 2024-05-30 PROCEDURE — 99215 OFFICE O/P EST HI 40 MIN: CPT | Performed by: PHYSICIAN ASSISTANT

## 2024-05-30 RX ORDER — PANTOPRAZOLE SODIUM 40 MG/1
40 TABLET, DELAYED RELEASE ORAL 2 TIMES DAILY
Qty: 180 TABLET | Refills: 1 | Status: SHIPPED | OUTPATIENT
Start: 2024-05-30

## 2024-05-30 NOTE — PATIENT INSTRUCTIONS
[facility] Medical Oncology and Hematology Team  [place] - [phone]    Your Team Member:  Advanced Practitioner:  Lynn Heck PA-C    Please answer Private and Unavailable Calls - this may be your team(s) contacting you.  If you have medical questions/concerns/issues - contact us either by (1) My Chart (2) [facility]

## 2024-05-31 LAB — MISCELLANEOUS LAB TEST RESULT: NORMAL

## 2024-06-04 ENCOUNTER — E-CONSULT (OUTPATIENT)
Dept: RHEUMATOLOGY | Facility: CLINIC | Age: 34
End: 2024-06-04
Payer: COMMERCIAL

## 2024-06-04 ENCOUNTER — TELEPHONE (OUTPATIENT)
Dept: HEMATOLOGY ONCOLOGY | Facility: CLINIC | Age: 34
End: 2024-06-04

## 2024-06-04 DIAGNOSIS — D70.9 NEUTROPENIA, UNSPECIFIED TYPE (HCC): Primary | ICD-10-CM

## 2024-06-04 PROCEDURE — 99451 NTRPROF PH1/NTRNET/EHR 5/>: CPT | Performed by: STUDENT IN AN ORGANIZED HEALTH CARE EDUCATION/TRAINING PROGRAM

## 2024-06-04 NOTE — TELEPHONE ENCOUNTER
Patient Call    Who are you speaking with? Patient    If it is not the patient, are they listed on an active communication consent form? Yes   What is the reason for this call? Discuss lab results   Does this require a call back? Yes   If a call back is required, please list best call back number [phone]    If a call back is required, advise that a message will be forwarded to their care team and someone will return their call as soon as possible.   Did you relay this information to the patient? Yes

## 2024-06-04 NOTE — TELEPHONE ENCOUNTER
Returned call to patient.  2 lab results (T-Cell receptors) that were pending are now resulted.  Patient would like to review results.

## 2024-06-04 NOTE — PROGRESS NOTES
"E-Consult  Estrada Gusman 34 y.o. male MRN: [id number]  Encounter Date: 06/04/24      Reason for Consult / Principal Problem: \"+ T- cell rearrangement studies in a chronically Neutropenic pt; family hx of SLE + Sjorens.  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    In regards to rheumatologic disease, if he is not having connective tissue disease symptoms or arthritic symptoms as noted in the reason for consult, especially in the setting of a negative SCAR, then it is unlikely that his neutropenia is related to an underlying rheumatologic condition. His inflammatory markers being unremarkable also goes against an inflammatory condition. Other than persistent thrombocytopenia he does not have any other additional lab values that would be consistent with a disease such as lupus (and with a negative SCAR would not give him a lupus diagnosis regardless, despite his family history).    Sjogren's however would not necessarily result in a positive SCAR and could potentially cause leukopenia (though typically would be lymphopenia) and mild thrombocytopenia.    RECOMMENDATIONS:  -Check SSA/SSB antibodies  -Check SCAR by IFA  -Please place routine outpatient referral to Rheumatology    Total time spent >5 min, >50% time spent reviewing/analyzing record, written report will be generated in the EMR. .  "

## 2024-06-04 NOTE — TELEPHONE ENCOUNTER
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    I will send a e consult to the Rheumatologic team to see if he should be evaluated by them further considering lack of symtpoms.  We will keep follow up appt with hem  in nov

## 2024-06-06 DIAGNOSIS — K21.9 GASTROESOPHAGEAL REFLUX DISEASE WITHOUT ESOPHAGITIS: Primary | ICD-10-CM

## 2024-06-06 DIAGNOSIS — Z82.69 FAMILY HISTORY OF SYSTEMIC LUPUS ERYTHEMATOSUS (SLE) IN MOTHER: ICD-10-CM

## 2024-06-06 DIAGNOSIS — R07.89 OTHER CHEST PAIN: ICD-10-CM

## 2024-06-06 DIAGNOSIS — D70.9 NEUTROPENIA, UNSPECIFIED TYPE (HCC): ICD-10-CM

## 2024-06-06 NOTE — PROGRESS NOTES
Called and spoke with the patient regarding E consult information as well as interpretation of the T-cell screening.      Patient agrees to undergo the workup as outlined by rheumatology  -SSA/SSB antibodies  -SCAR by IFA  -Please place routine outpatient referral to Rheumatology    Patient also brought up his feeling of globus sensation and the lack of response to GERD medications.  With this chest tightness being present for several years, patient should undergo CT imaging to rule out thymic mass or mediastinal mass.  Patient was in agreement.  This will be scheduled by the office.

## 2024-06-06 NOTE — Clinical Note
Please schedule rheum appt - pt with e consult completed w/ recommendation for routine follow up with rheumatology  CT scan to be scheduled at pt earliest convenience

## 2024-06-07 ENCOUNTER — TELEPHONE (OUTPATIENT)
Dept: HEMATOLOGY ONCOLOGY | Facility: CLINIC | Age: 34
End: 2024-06-07

## 2024-06-07 NOTE — TELEPHONE ENCOUNTER
Scheduled. Called patient and left voicemail with location, date and time. Provided patient with call back numbers if date and time does not work.

## 2024-06-19 LAB — MISCELLANEOUS LAB TEST RESULT: NORMAL

## 2024-06-27 ENCOUNTER — OFFICE VISIT (OUTPATIENT)
Dept: GASTROENTEROLOGY | Facility: CLINIC | Age: 34
End: 2024-06-27
Payer: COMMERCIAL

## 2024-06-27 VITALS
BODY MASS INDEX: 20.67 KG/M2 | SYSTOLIC BLOOD PRESSURE: 118 MMHG | HEIGHT: 73 IN | WEIGHT: 156 LBS | TEMPERATURE: 98.7 F | DIASTOLIC BLOOD PRESSURE: 70 MMHG

## 2024-06-27 DIAGNOSIS — K20.0 EOSINOPHILIC ESOPHAGITIS: Primary | ICD-10-CM

## 2024-06-27 DIAGNOSIS — K21.9 GASTROESOPHAGEAL REFLUX DISEASE WITHOUT ESOPHAGITIS: ICD-10-CM

## 2024-06-27 PROCEDURE — 99214 OFFICE O/P EST MOD 30 MIN: CPT | Performed by: PHYSICIAN ASSISTANT

## 2024-06-27 RX ORDER — OMEPRAZOLE 40 MG/1
40 CAPSULE, DELAYED RELEASE ORAL DAILY
Qty: 90 CAPSULE | Refills: 3 | Status: SHIPPED | OUTPATIENT
Start: 2024-06-27

## 2024-06-27 NOTE — PROGRESS NOTES
"[facility] - Outpatient Follow-up Note  Estrada Gusman 34 y.o. male MRN: [id number]  Encounter: [id number]          ASSESSMENT AND PLAN:      Estrada is a 32 y/o male with EOE, GERD, allergic rhinitis who presents for follow-up     1. Eosinophilic esophagitis  2. GERD  Repeat EGD 4/2023 was WNL endoscopically with esophageal bx noting only 2 eosinophils pHPF in distal esophagus and 10 pHPF in proximal esophagus without any further EOE; gastric bx notable to active gastritis without H.pylori. pt wanted to stay on protonix BID at his last OV. He says this is working well for him but once in a while, he will get heartburn at night. He says he has not been able to identify triggers of this as it occurs intermittently. He says he follows strict anti-gerd diet and sleeps on 3-4 pills every night. He says his last meal is usually 3-4 hrs prior to laying down or reclining. He says he would prefer to be on the PPI once/day but as he is nervous about getting rebound symptoms, he would like \"something stronger\" than protonix sent in.   -explained to pt that it does not have to be related to PO intake for him to get flares; I explained you can get flares of heartburn for many reason, including stress/anxiety, depression, chronic lack of sleep, dehydration so if the night-time symptoms start to occur more frequently, I recommend he starts to journal   -stop protonix BID  -start omeprazole 40 mg in the evenings: I explained omeprazole has been found to be more efficacious than protonix in trials       ______________________________________________________________________    SUBJECTIVE:  He says this is working well for him but once in a while, he will get heartburn at night. He says he has not been able to identify triggers of this as it occurs intermittently. He says he follows strict anti-gerd diet and sleeps on 3-4 pills every night. He says his last meal is usually 3-4 hrs prior to laying down " "or reclining. He says he would prefer to be on the PPI once/day but as he is nervous about getting rebound symptoms, he would like \"something stronger\" than protonix sent in. He denies n/v, trouble swallowing or painful swallowing, abdominal pain, constipation, diarrhea, NSAID use, weight changes, fevers, chills, night sweats, bloody or black BM.       REVIEW OF SYSTEMS IS OTHERWISE NEGATIVE.      Historical Information   Past Medical History:   Diagnosis Date    Asthma     GERD (gastroesophageal reflux disease)     Iron deficiency anemia      Past Surgical History:   Procedure Laterality Date    WISDOM TOOTH EXTRACTION       Social History   Social History     Substance and Sexual Activity   Alcohol Use Never     Social History     Substance and Sexual Activity   Drug Use Never     Social History     Tobacco Use   Smoking Status Never    Passive exposure: Past (family members smoked over 10 years ago)   Smokeless Tobacco Never     Family History   Problem Relation Age of Onset    Arthritis Mother     Colon polyps Father     Hypertension Father     Stroke Maternal Grandfather        Meds/Allergies       Current Outpatient Medications:     albuterol (Ventolin HFA) 90 mcg/act inhaler    Butenafine HCl 1 % cream    ketoconazole (NIZORAL) 2 % cream    levocetirizine (XYZAL) 5 MG tablet    pantoprazole (PROTONIX) 40 mg tablet    triamcinolone (KENALOG) 0.1 % cream    No Known Allergies        Objective     There were no vitals taken for this visit. There is no height or weight on file to calculate BMI.      PHYSICAL EXAM:      General Appearance:   Alert, cooperative, no distress   HEENT:   Normocephalic, atraumatic, anicteric.     Neck:  Supple, symmetrical, trachea midline   Lungs:   Clear to auscultation bilaterally; no rales, rhonchi or wheezing; respirations unlabored    Heart::   Regular rate and rhythm; no murmur, rub, or gallop.   Abdomen:   Soft, non-tender, non-distended; normal bowel sounds; no masses, no " organomegaly    Genitalia:   Deferred    Rectal:   Deferred    Extremities:  No cyanosis, clubbing or edema    Pulses:  2+ and symmetric    Skin:  No jaundice, rashes, or lesions    Lymph nodes:  No palpable cervical lymphadenopathy        Lab Results:   No visits with results within 1 Day(s) from this visit.   Latest known visit with results is:   Transcribe Orders on 05/31/2024   Component Date Value    Miscellaneous Lab Test R* 05/24/2024 T CELL RECEPTOR GAMMA GENE REARRANGEMENT          Radiology Results:   No results found.

## 2024-06-28 ENCOUNTER — HOSPITAL ENCOUNTER (OUTPATIENT)
Dept: CT IMAGING | Facility: HOSPITAL | Age: 34
Discharge: HOME | End: 2024-06-28
Payer: COMMERCIAL

## 2024-06-28 DIAGNOSIS — K21.9 GASTROESOPHAGEAL REFLUX DISEASE WITHOUT ESOPHAGITIS: ICD-10-CM

## 2024-06-28 DIAGNOSIS — D70.9 NEUTROPENIA, UNSPECIFIED TYPE (HCC): ICD-10-CM

## 2024-06-28 DIAGNOSIS — R07.89 OTHER CHEST PAIN: ICD-10-CM

## 2024-06-28 PROCEDURE — 71260 CT THORAX DX C+: CPT

## 2024-06-28 RX ADMIN — IOHEXOL 85 ML: 350 INJECTION, SOLUTION INTRAVENOUS at 10:56

## 2024-07-02 ENCOUNTER — OFFICE VISIT (OUTPATIENT)
Dept: FAMILY MEDICINE CLINIC | Facility: CLINIC | Age: 34
End: 2024-07-02
Payer: COMMERCIAL

## 2024-07-02 VITALS
HEART RATE: 78 BPM | SYSTOLIC BLOOD PRESSURE: 118 MMHG | WEIGHT: 155 LBS | TEMPERATURE: 96.8 F | OXYGEN SATURATION: 97 % | BODY MASS INDEX: 20.45 KG/M2 | DIASTOLIC BLOOD PRESSURE: 78 MMHG

## 2024-07-02 DIAGNOSIS — L73.9 FOLLICULITIS: Primary | ICD-10-CM

## 2024-07-02 DIAGNOSIS — R21 RASH AND NONSPECIFIC SKIN ERUPTION: ICD-10-CM

## 2024-07-02 PROCEDURE — 99213 OFFICE O/P EST LOW 20 MIN: CPT | Performed by: FAMILY MEDICINE

## 2024-07-02 RX ORDER — PANTOPRAZOLE SODIUM 40 MG/1
40 TABLET, DELAYED RELEASE ORAL 2 TIMES DAILY
COMMUNITY

## 2024-07-02 NOTE — ASSESSMENT & PLAN NOTE
Present on foot for the last few months, not bothering patient, reports using mupirocin in past with resolution

## 2024-07-02 NOTE — PROGRESS NOTES
Assessment/Plan:       Problem List Items Addressed This Visit          Musculoskeletal and Integument    Rash and nonspecific skin eruption     Macular rash that began shortly after taking omeprazole, advise holding omerpazole, restart pantoprazole, if not resolving, obtain blood work, consider biopsy vs dermatology referral.         Relevant Medications    mupirocin (BACTROBAN) 2 % ointment    Folliculitis - Primary     Present on foot for the last few months, not bothering patient, reports using mupirocin in past with resolution         Relevant Medications    mupirocin (BACTROBAN) 2 % ointment         Subjective:      Patient ID: Estrada Gusman is a 34 y.o. male.    Rash  This is a recurrent problem. The current episode started in the past 7 days. The problem is unchanged. The rash is characterized by redness. He was exposed to a new medication.       34 year old presenting for rash, non painful, not itching, presenting on torso, back of thighs and shoulder.    Notes rash began after starting omperazole, started on Saturday and first noted rash later that day.    Reports never having similar rash in past.    Otherwise feeling well.      The following portions of the patient's history were reviewed and updated as appropriate: allergies, current medications, past family history, past medical history, past social history, past surgical history and problem list.      Current Outpatient Medications:     albuterol (Ventolin HFA) 90 mcg/act inhaler, Inhale 1-2 puffs every 6 (six) hours as needed for wheezing, Disp: 8 g, Rfl: 0    Butenafine HCl 1 % cream, Apply topically 2 (two) times a day, Disp: , Rfl:     ketoconazole (NIZORAL) 2 % cream, Apply topically 2 (two) times a day, Disp: 15 g, Rfl: 0    levocetirizine (XYZAL) 5 MG tablet, Take 1 tablet (5 mg total) by mouth every evening ( for allergies ), Disp: 90 tablet, Rfl: 1    mupirocin (BACTROBAN) 2 % ointment, Apply topically 3 (three) times a day, Disp: 50 g,  Rfl: 1    omeprazole (PriLOSEC) 40 MG capsule, Take 1 capsule (40 mg total) by mouth daily On an empty stomach, Disp: 90 capsule, Rfl: 3    pantoprazole (PROTONIX) 40 mg tablet, Take 40 mg by mouth 2 (two) times a day, Disp: , Rfl:     triamcinolone (KENALOG) 0.1 % cream, Apply topically 2 (two) times a day, Disp: 30 g, Rfl: 0     Review of Systems   Constitutional:  Negative for activity change and appetite change.   Respiratory:  Negative for apnea.    Cardiovascular:  Negative for chest pain and palpitations.   Gastrointestinal:  Negative for abdominal distention and abdominal pain.   Musculoskeletal:  Negative for arthralgias and back pain.   Skin:  Positive for rash.         Objective:      /78 (BP Location: Left arm, Cuff Size: Standard)   Pulse 78   Temp (!) 96.8 °F (36 °C) (Tympanic)   Wt 70.3 kg (155 lb)   SpO2 97%   BMI 20.45 kg/m²          Physical Exam  Constitutional:       Appearance: Normal appearance.   Cardiovascular:      Rate and Rhythm: Normal rate and regular rhythm.      Pulses: Normal pulses.      Heart sounds: Normal heart sounds.   Pulmonary:      Effort: Pulmonary effort is normal.   Musculoskeletal:         General: Normal range of motion.   Skin:     Capillary Refill: Capillary refill takes less than 2 seconds.      Comments: Macular rash on abdomen, back of thighs, and forearms follicular dermatits on bilateral feet   Neurological:      General: No focal deficit present.      Mental Status: He is alert and oriented to person, place, and time.           Seng Miller MD  Answers submitted by the patient for this visit:  Rash Questionnaire (Submitted on 7/1/2024)  Chief Complaint: Rash

## 2024-07-02 NOTE — ASSESSMENT & PLAN NOTE
Macular rash that began shortly after taking omeprazole, advise holding omerpazole, restart pantoprazole, if not resolving, obtain blood work, consider biopsy vs dermatology referral.

## 2024-08-05 ENCOUNTER — APPOINTMENT (OUTPATIENT)
Dept: LAB | Facility: CLINIC | Age: 34
End: 2024-08-05
Payer: COMMERCIAL

## 2024-08-05 DIAGNOSIS — D70.9 NEUTROPENIA, UNSPECIFIED TYPE (HCC): ICD-10-CM

## 2024-08-05 DIAGNOSIS — Z82.69 FAMILY HISTORY OF SYSTEMIC LUPUS ERYTHEMATOSUS (SLE) IN MOTHER: ICD-10-CM

## 2024-08-05 LAB
BASOPHILS # BLD AUTO: 0 THOUSANDS/ÂΜL (ref 0–0.1)
BASOPHILS NFR BLD AUTO: 0 % (ref 0–1)
EOSINOPHIL # BLD AUTO: 0.06 THOUSAND/ÂΜL (ref 0–0.61)
EOSINOPHIL NFR BLD AUTO: 2 % (ref 0–6)
ERYTHROCYTE [DISTWIDTH] IN BLOOD BY AUTOMATED COUNT: 11.5 % (ref 11.6–15.1)
HCT VFR BLD AUTO: 39.4 % (ref 36.5–49.3)
HGB BLD-MCNC: 13.7 G/DL (ref 12–17)
IMM GRANULOCYTES # BLD AUTO: 0.01 THOUSAND/UL (ref 0–0.2)
IMM GRANULOCYTES NFR BLD AUTO: 0 % (ref 0–2)
LYMPHOCYTES # BLD AUTO: 1.08 THOUSANDS/ÂΜL (ref 0.6–4.47)
LYMPHOCYTES NFR BLD AUTO: 38 % (ref 14–44)
MCH RBC QN AUTO: 33.5 PG (ref 26.8–34.3)
MCHC RBC AUTO-ENTMCNC: 34.8 G/DL (ref 31.4–37.4)
MCV RBC AUTO: 96 FL (ref 82–98)
MONOCYTES # BLD AUTO: 0.39 THOUSAND/ÂΜL (ref 0.17–1.22)
MONOCYTES NFR BLD AUTO: 14 % (ref 4–12)
NEUTROPHILS # BLD AUTO: 1.28 THOUSANDS/ÂΜL (ref 1.85–7.62)
NEUTS SEG NFR BLD AUTO: 46 % (ref 43–75)
NRBC BLD AUTO-RTO: 0 /100 WBCS
PLATELET # BLD AUTO: 133 THOUSANDS/UL (ref 149–390)
PMV BLD AUTO: 12.6 FL (ref 8.9–12.7)
RBC # BLD AUTO: 4.09 MILLION/UL (ref 3.88–5.62)
WBC # BLD AUTO: 2.82 THOUSAND/UL (ref 4.31–10.16)

## 2024-08-05 PROCEDURE — 85025 COMPLETE CBC W/AUTO DIFF WBC: CPT

## 2024-08-05 PROCEDURE — 36415 COLL VENOUS BLD VENIPUNCTURE: CPT

## 2024-08-05 PROCEDURE — 86038 ANTINUCLEAR ANTIBODIES: CPT

## 2024-08-05 PROCEDURE — 86235 NUCLEAR ANTIGEN ANTIBODY: CPT

## 2024-08-06 LAB
ANA TITR SER IF: NEGATIVE {TITER}
ENA SS-A AB SER-ACNC: <0.2 AI (ref 0–0.9)
ENA SS-B AB SER-ACNC: <0.2 AI (ref 0–0.9)

## 2024-08-19 ENCOUNTER — CONSULT (OUTPATIENT)
Dept: RHEUMATOLOGY | Facility: CLINIC | Age: 34
End: 2024-08-19
Payer: COMMERCIAL

## 2024-08-19 VITALS
HEIGHT: 73 IN | BODY MASS INDEX: 20.81 KG/M2 | WEIGHT: 157 LBS | SYSTOLIC BLOOD PRESSURE: 108 MMHG | DIASTOLIC BLOOD PRESSURE: 68 MMHG

## 2024-08-19 DIAGNOSIS — D70.9 NEUTROPENIA, UNSPECIFIED TYPE (HCC): ICD-10-CM

## 2024-08-19 DIAGNOSIS — Z82.69 FAMILY HISTORY OF SYSTEMIC LUPUS ERYTHEMATOSUS (SLE) IN MOTHER: ICD-10-CM

## 2024-08-19 PROCEDURE — 99244 OFF/OP CNSLTJ NEW/EST MOD 40: CPT | Performed by: INTERNAL MEDICINE

## 2024-08-19 NOTE — PROGRESS NOTES
This is a Rheumatology Consult seen at the request of Dr Fitzpatrick      HPI: Estrada Gusman is a 35 y/o male who presents for further evaluation chronic neutropenia. He has past medical history GERD, asthma, anemoa    H/o intermittent rash which has resolved with topical steroid cream    Denies Raynaud's, sicca, seizures or strokes, thrombotic events, thrombotic events, renal failure, pleural-pericardial effusion    No significant joint pain, swelling or stiffness    He is being closely monitored per hematology. Chronic neutropenia since 2021, also mild thrombocytopenia    W/u SCAR X 2 negative    RF, CRP checked and negative    SSA/SSB negative         --------------------------------------------------------------------------------------------------------        ROS:    + as above with the addition of GERD, no current rashes,     - for: Fevers, Chills or sweats.  No HAs or scalp tenderness.  No jaw claudication.  No acute visual or eye changes.  No dry eyes.  No auditory complaints.  No oral lesions or ulcers.  No dry mouth.  No sore throat or cough.  No chest pains or palpitations.  No shortness of breath, dyspnea on exertion or wheezing.  No hemotpysis.  No abdominal pain,  diarrhea or constipation.  No urinary complaints. No rashes.    All other ROS was reviewed and negative except as above         --------------------------------------------------------------------------------------------------------    Past Medical History    Past Medical History:   Diagnosis Date    Asthma     GERD (gastroesophageal reflux disease)     Iron deficiency anemia            Past Surgical History    Past Surgical History:   Procedure Laterality Date    WISDOM TOOTH EXTRACTION             Family History    Family History   Problem Relation Age of Onset    Arthritis Mother     Colon polyps Father     Hypertension Father     Stroke Maternal Grandfather       Mother has lupus and Sjogren's syndrome      Social History    Social History  "    Tobacco Use    Smoking status: Never     Passive exposure: Past (family members smoked over 10 years ago)    Smokeless tobacco: Never   Vaping Use    Vaping status: Never Used   Substance Use Topics    Alcohol use: Never    Drug use: Never     Currently unemployed      Allergies    No Known Allergies      Medications    Current Outpatient Medications   Medication Instructions    albuterol (Ventolin HFA) 90 mcg/act inhaler 1-2 puffs, Inhalation, Every 6 hours PRN    Butenafine HCl 1 % cream Topical, 2 times daily, PRN    ketoconazole (NIZORAL) 2 % cream Topical, 2 times daily    levocetirizine (XYZAL) 5 mg, Oral, Every evening, ( for allergies )    mupirocin (BACTROBAN) 2 % ointment Topical, 3 times daily    omeprazole (PRILOSEC) 40 mg, Oral, Daily, On an empty stomach    pantoprazole (PROTONIX) 40 mg, Oral, 2 times daily    triamcinolone (KENALOG) 0.1 % cream Topical, 2 times daily          Physical Exam    /68   Ht 6' 1\" (1.854 m)   Wt 71.2 kg (157 lb)   BMI 20.71 kg/m²     GEN: AAO, No apparent distress.  Patient is well developed.  HEENT:  Pupils are equal, round and reactive.  Sclera are clear.  Fundoscopic exam is normal.  External ears are without lesions.  Oral pharynx is clear of ulcers or other lesions.  MMM.   NECK:  Supple.  There is no adenopathy appreciable in anterior or posterior cervical chains or supraclavicularly.  JVP is normal.    HEART: Regular rate and rhythm.  There is no appreciable murmur, gallop or rub.  LUNGS: Clear to auscultation.  ABD:  Soft, without tenderness, rebound or guarding.  No appreciable organomegally.  NEURO: Speech and cognition are normal.  Strength is 5/5 throughout.  Tone is normal.  DTRs are 2/4 at the knees, ankles and elbows.  Gait is normal.  SKIN: There are no rashes or lesions    MUSCULOSKELETAL:   -Hands: normal  -Wrists: normal  -Elbows: normal  -Shoulders: normal  -Neck: normal  -Back: normal  -Hips: normal  -Knees: normal  -Ankles: normal  -Feet: " normal      ________________________________________________________________________          Results Review     Latest Reference Range & Units 05/24/24 08:12 08/05/24 07:52   SCAR SCREEN Negative  Negative    RHEUMATOID FACTOR Negative  Negative    C-REACTIVE PROTEIN <3.0 mg/L <1.0    SSA (RO) ANTIBODY 0.0 - 0.9 AI  <0.2   SSB (LA) ANTIBODY 0.0 - 0.9 AI  <0.2         Impressions and Plans:    This is a 35 y/o male with chronic neutropenia and mild thrombocytopenia    On exam no swollen or tender joints, synovitis, effusion etc    No Raynaud's or sicca symptoms    W/u SCAR X 2 negative    RF, SSA SSB negative    CRP was normal    At this time no other s/s suggestive inflammatory rheumatic disorder    Recommend f/u Hematology    RTC as needed        Thank you for involving me in this patient's care.        Jai Melchor MD  [facility]

## 2024-08-20 ENCOUNTER — OFFICE VISIT (OUTPATIENT)
Dept: FAMILY MEDICINE CLINIC | Facility: CLINIC | Age: 34
End: 2024-08-20
Payer: COMMERCIAL

## 2024-08-20 VITALS
SYSTOLIC BLOOD PRESSURE: 128 MMHG | RESPIRATION RATE: 18 BRPM | OXYGEN SATURATION: 99 % | HEIGHT: 73 IN | TEMPERATURE: 98.6 F | DIASTOLIC BLOOD PRESSURE: 62 MMHG | WEIGHT: 157.4 LBS | HEART RATE: 68 BPM | BODY MASS INDEX: 20.86 KG/M2

## 2024-08-20 DIAGNOSIS — I73.00 RAYNAUD'S DISEASE WITHOUT GANGRENE: Primary | ICD-10-CM

## 2024-08-20 DIAGNOSIS — K20.0 EE (EOSINOPHILIC ESOPHAGITIS): ICD-10-CM

## 2024-08-20 DIAGNOSIS — D72.819 LEUKOPENIA, UNSPECIFIED TYPE: ICD-10-CM

## 2024-08-20 PROBLEM — B36.9 FUNGAL RASH OF TRUNK: Status: RESOLVED | Noted: 2023-05-31 | Resolved: 2024-08-20

## 2024-08-20 PROCEDURE — 99214 OFFICE O/P EST MOD 30 MIN: CPT | Performed by: FAMILY MEDICINE

## 2024-08-20 NOTE — PATIENT INSTRUCTIONS
Reviewed health history.  The rash she had been seen for July 2 appears to be related to CT scan dye, has resolved.    Appears to have Raynaud's, mom does have this.  No ulcerations.  Observe.  We did review consult with rheumatology yesterday.  Previous SCAR, rheumatological testing unremarkable/inflammatory markers were low-only abnormality was test ordered by hematology which I am not familiar with    He does continue to see hematology, August WBC 2.82 stable    Other blood work showed iron panel back in January was okay, last year TSH, B12 were okay.    He will continue to exercise, watch pressure on forefoot, he does change out his boots, shoes routinely with exercise.  Does use inserts.  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    He saw GI in June, pantoprazole twice daily was changed to omeprazole once daily, he feels that is working well.  He has had no increased wheezing, uses albuterol at times.    He does have a routine physical set up for a few months, call sooner if needed

## 2024-08-20 NOTE — PROGRESS NOTES
FAMILY PRACTICE OFFICE VISIT  Manuel Fitzpatrick D.O.    [facility]  [facility] [address]  [address]  [place], [address]      NAME: Estrada Gusman  AGE: 34 y.o. SEX: male  : 1990   MRN: [id number]    DATE: 2024  TIME: 8:50 AM      Assessment and Plan     1. Raynaud's disease  2. Leukopenia, unspecified type  3. EE (eosinophilic esophagitis)      Patient Instructions   Reviewed health history.  The rash she had been seen for  appears to be related to CT scan dye, has resolved.    Appears to have Raynaud's, mom does have this.  No ulcerations.  Observe.  We did review consult with rheumatology yesterday.  Previous SCAR, rheumatological testing unremarkable/inflammatory markers were low-only abnormality was test ordered by hematology which I am not familiar with    He does continue to see hematology, August WBC 2.82 stable    Other blood work showed iron panel back in January was okay, last year TSH, B12 were okay.    He will continue to exercise, watch pressure on forefoot, he does change out his boots, shoes routinely with exercise.  Does use inserts.  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    He saw GI in , pantoprazole twice daily was changed to omeprazole once daily, he feels that is working well.  He has had no increased wheezing, uses albuterol at times.    He does have a routine physical set up for a few months, call sooner if needed    Chief Complaint     Chief Complaint   Patient presents with    Toe Pain      About 2 months ago it started and for the past 2 weeks very common       History of Present Illness   Estrada Gusman is a 34 y.o.-year-old male who is in to discuss burning across all distal toes, notes for the past few months.  Notes both day and night.  Does have some redness of all toes, mom has Raynaud's.  Also has some redness of distal fingers  "both hands.  No ulcerations.    Did see rheumatology yesterday, continues to see hematology, low white blood cell count chronically.    Does exercise routinely.  Has had fungal rash on feet, no yellowing or thickening of nails.    Had been seen early July with generalized rash, onset day after had CT of chest with dye.  That resolved    Does have some chronic low back issues, no burning or paresthesias into the thighs, calfs      Review of Systems   Review of Systems   Constitutional:  Negative for chills, fatigue, fever and unexpected weight change.   Skin:         See photos of hands, feet.  Slightly cool toes, no ulcers, has redness.  No sign of active fungal infection.  Pulses are okay, good hair growth.  No edema at ankles.       Active Problem List     Patient Active Problem List   Diagnosis    Allergic rhinitis    Gastroesophageal reflux disease     Globus sensation throat    Leukocytopenia ( WBC 2.75 )    Tinea cruris    Iron deficiency    Leukopenia    EE (eosinophilic esophagitis)    Raynaud's disease       Past Medical History:  Reviewed    Past Surgical History:  Reviewed    Family History:  Reviewed    Social History:  Reviewed    Objective     Vitals:    08/20/24 0802   BP: 128/62   BP Location: Right arm   Patient Position: Sitting   Cuff Size: Standard   Pulse: 68   Resp: 18   Temp: 98.6 °F (37 °C)   TempSrc: Tympanic   SpO2: 99%   Weight: 71.4 kg (157 lb 6.4 oz)   Height: 6' 1\" (1.854 m)     Body mass index is 20.77 kg/m².    BP Readings from Last 3 Encounters:   08/20/24 128/62   08/19/24 108/68   07/02/24 118/78       Wt Readings from Last 3 Encounters:   08/20/24 71.4 kg (157 lb 6.4 oz)   08/19/24 71.2 kg (157 lb)   07/02/24 70.3 kg (155 lb)       Physical Exam  Constitutional:       Appearance: Normal appearance. He is not ill-appearing.   Abdominal:      Tenderness: There is no abdominal tenderness.   Musculoskeletal:      Right lower leg: No edema.      Left lower leg: No edema.   Skin:     " Coloration: Skin is not jaundiced.   Neurological:      Mental Status: He is alert.               ALLERGIES:  Allergies   Allergen Reactions    Contrast Dye [Iodinated Contrast Media] Rash     'Red dots neck to feet day after CT w dye'       Current Medications     Current Outpatient Medications   Medication Sig Dispense Refill    albuterol (Ventolin HFA) 90 mcg/act inhaler Inhale 1-2 puffs every 6 (six) hours as needed for wheezing 8 g 0    Butenafine HCl 1 % cream Apply topically 2 (two) times a day PRN      levocetirizine (XYZAL) 5 MG tablet Take 1 tablet (5 mg total) by mouth every evening ( for allergies ) 90 tablet 1    omeprazole (PriLOSEC) 40 MG capsule Take 1 capsule (40 mg total) by mouth daily On an empty stomach 90 capsule 3    ketoconazole (NIZORAL) 2 % cream Apply topically 2 (two) times a day (Patient taking differently: Apply topically 2 (two) times a day PRN) 15 g 0    triamcinolone (KENALOG) 0.1 % cream Apply topically 2 (two) times a day (Patient taking differently: Apply topically 2 (two) times a day PRN) 30 g 0     No current facility-administered medications for this visit.            No orders of the defined types were placed in this encounter.        Manuel Fitzpatrick DO

## 2024-09-04 ENCOUNTER — TELEPHONE (OUTPATIENT)
Dept: HEMATOLOGY ONCOLOGY | Facility: CLINIC | Age: 34
End: 2024-09-04

## 2024-09-04 NOTE — TELEPHONE ENCOUNTER
Let message to let patient know his appointment on 11/29/2024  with haider shah has been reschedule. New appointment is 11/26/2024 @ 11:00 hopeline provided.

## 2024-11-06 ENCOUNTER — RA CDI HCC (OUTPATIENT)
Dept: OTHER | Facility: HOSPITAL | Age: 34
End: 2024-11-06

## 2024-11-12 ENCOUNTER — APPOINTMENT (OUTPATIENT)
Dept: LAB | Facility: CLINIC | Age: 34
End: 2024-11-12
Payer: COMMERCIAL

## 2024-11-12 DIAGNOSIS — E61.1 IRON DEFICIENCY: ICD-10-CM

## 2024-11-12 DIAGNOSIS — Z82.69 FAMILY HISTORY OF SYSTEMIC LUPUS ERYTHEMATOSUS (SLE) IN MOTHER: ICD-10-CM

## 2024-11-12 DIAGNOSIS — D70.9 NEUTROPENIA, UNSPECIFIED TYPE (HCC): ICD-10-CM

## 2024-11-12 DIAGNOSIS — D69.6 THROMBOCYTOPENIA (HCC): ICD-10-CM

## 2024-11-12 LAB
ALBUMIN SERPL BCG-MCNC: 4.5 G/DL (ref 3.5–5)
ALP SERPL-CCNC: 50 U/L (ref 34–104)
ALT SERPL W P-5'-P-CCNC: 8 U/L (ref 7–52)
ANION GAP SERPL CALCULATED.3IONS-SCNC: 8 MMOL/L (ref 4–13)
AST SERPL W P-5'-P-CCNC: 19 U/L (ref 13–39)
BASOPHILS # BLD AUTO: 0.01 THOUSANDS/ÂΜL (ref 0–0.1)
BASOPHILS NFR BLD AUTO: 0 % (ref 0–1)
BILIRUB SERPL-MCNC: 0.77 MG/DL (ref 0.2–1)
BUN SERPL-MCNC: 21 MG/DL (ref 5–25)
CALCIUM SERPL-MCNC: 9.1 MG/DL (ref 8.4–10.2)
CHLORIDE SERPL-SCNC: 102 MMOL/L (ref 96–108)
CO2 SERPL-SCNC: 30 MMOL/L (ref 21–32)
CREAT SERPL-MCNC: 0.77 MG/DL (ref 0.6–1.3)
CRP SERPL QL: <1 MG/L
EOSINOPHIL # BLD AUTO: 0.05 THOUSAND/ÂΜL (ref 0–0.61)
EOSINOPHIL NFR BLD AUTO: 2 % (ref 0–6)
ERYTHROCYTE [DISTWIDTH] IN BLOOD BY AUTOMATED COUNT: 11.5 % (ref 11.6–15.1)
FERRITIN SERPL-MCNC: 133 NG/ML (ref 24–336)
FOLATE SERPL-MCNC: >22.3 NG/ML
GFR SERPL CREATININE-BSD FRML MDRD: 117 ML/MIN/1.73SQ M
GLUCOSE P FAST SERPL-MCNC: 88 MG/DL (ref 65–99)
HCT VFR BLD AUTO: 40.1 % (ref 36.5–49.3)
HGB BLD-MCNC: 13.8 G/DL (ref 12–17)
IMM GRANULOCYTES # BLD AUTO: 0 THOUSAND/UL (ref 0–0.2)
IMM GRANULOCYTES NFR BLD AUTO: 0 % (ref 0–2)
IRON SATN MFR SERPL: 38 % (ref 15–50)
IRON SERPL-MCNC: 120 UG/DL (ref 50–212)
LYMPHOCYTES # BLD AUTO: 1.1 THOUSANDS/ÂΜL (ref 0.6–4.47)
LYMPHOCYTES NFR BLD AUTO: 40 % (ref 14–44)
MCH RBC QN AUTO: 32.8 PG (ref 26.8–34.3)
MCHC RBC AUTO-ENTMCNC: 34.4 G/DL (ref 31.4–37.4)
MCV RBC AUTO: 95 FL (ref 82–98)
MONOCYTES # BLD AUTO: 0.43 THOUSAND/ÂΜL (ref 0.17–1.22)
MONOCYTES NFR BLD AUTO: 16 % (ref 4–12)
NEUTROPHILS # BLD AUTO: 1.17 THOUSANDS/ÂΜL (ref 1.85–7.62)
NEUTS SEG NFR BLD AUTO: 42 % (ref 43–75)
NRBC BLD AUTO-RTO: 0 /100 WBCS
PLATELET # BLD AUTO: 145 THOUSANDS/UL (ref 149–390)
PMV BLD AUTO: 12.6 FL (ref 8.9–12.7)
POTASSIUM SERPL-SCNC: 4 MMOL/L (ref 3.5–5.3)
PROT SERPL-MCNC: 7 G/DL (ref 6.4–8.4)
RBC # BLD AUTO: 4.21 MILLION/UL (ref 3.88–5.62)
SODIUM SERPL-SCNC: 140 MMOL/L (ref 135–147)
TIBC SERPL-MCNC: 320 UG/DL (ref 250–450)
UIBC SERPL-MCNC: 200 UG/DL (ref 155–355)
VIT B12 SERPL-MCNC: 684 PG/ML (ref 180–914)
WBC # BLD AUTO: 2.76 THOUSAND/UL (ref 4.31–10.16)

## 2024-11-12 PROCEDURE — 85025 COMPLETE CBC W/AUTO DIFF WBC: CPT

## 2024-11-12 PROCEDURE — 82746 ASSAY OF FOLIC ACID SERUM: CPT

## 2024-11-12 PROCEDURE — 82607 VITAMIN B-12: CPT

## 2024-11-12 PROCEDURE — 86140 C-REACTIVE PROTEIN: CPT

## 2024-11-12 PROCEDURE — 82728 ASSAY OF FERRITIN: CPT

## 2024-11-12 PROCEDURE — 83540 ASSAY OF IRON: CPT

## 2024-11-12 PROCEDURE — 83550 IRON BINDING TEST: CPT

## 2024-11-12 PROCEDURE — 36415 COLL VENOUS BLD VENIPUNCTURE: CPT

## 2024-11-12 PROCEDURE — 80053 COMPREHEN METABOLIC PANEL: CPT

## 2024-11-14 ENCOUNTER — OFFICE VISIT (OUTPATIENT)
Dept: FAMILY MEDICINE CLINIC | Facility: CLINIC | Age: 34
End: 2024-11-14
Payer: COMMERCIAL

## 2024-11-14 VITALS
OXYGEN SATURATION: 98 % | DIASTOLIC BLOOD PRESSURE: 80 MMHG | BODY MASS INDEX: 20.7 KG/M2 | HEIGHT: 73 IN | WEIGHT: 156.2 LBS | SYSTOLIC BLOOD PRESSURE: 126 MMHG | RESPIRATION RATE: 12 BRPM | HEART RATE: 81 BPM

## 2024-11-14 DIAGNOSIS — D72.819 LEUKOPENIA, UNSPECIFIED TYPE: ICD-10-CM

## 2024-11-14 DIAGNOSIS — Z00.00 ENCOUNTER FOR ANNUAL PHYSICAL EXAM: Primary | ICD-10-CM

## 2024-11-14 DIAGNOSIS — L30.9 DERMATITIS: ICD-10-CM

## 2024-11-14 DIAGNOSIS — K21.9 GASTROESOPHAGEAL REFLUX DISEASE WITHOUT ESOPHAGITIS: ICD-10-CM

## 2024-11-14 DIAGNOSIS — R20.8 BURNING SENSATION OF FEET: ICD-10-CM

## 2024-11-14 DIAGNOSIS — R21 RASH: ICD-10-CM

## 2024-11-14 DIAGNOSIS — I73.00 RAYNAUD'S DISEASE WITHOUT GANGRENE: ICD-10-CM

## 2024-11-14 DIAGNOSIS — E61.1 IRON DEFICIENCY: ICD-10-CM

## 2024-11-14 DIAGNOSIS — K20.0 EE (EOSINOPHILIC ESOPHAGITIS): ICD-10-CM

## 2024-11-14 PROCEDURE — 99395 PREV VISIT EST AGE 18-39: CPT | Performed by: FAMILY MEDICINE

## 2024-11-14 RX ORDER — KETOCONAZOLE 20 MG/G
CREAM TOPICAL 2 TIMES DAILY
Qty: 30 G | Refills: 0 | Status: SHIPPED | OUTPATIENT
Start: 2024-11-14 | End: 2024-11-28

## 2024-11-14 RX ORDER — TRIAMCINOLONE ACETONIDE 1 MG/G
CREAM TOPICAL 2 TIMES DAILY
Qty: 15 G | Refills: 1 | Status: SHIPPED | OUTPATIENT
Start: 2024-11-14 | End: 2024-11-24

## 2024-11-14 NOTE — ASSESSMENT & PLAN NOTE
Inflammatory markers all normal, glucose is fine at 88, B12 level was normal at 684.  Continue to monitor.

## 2024-11-14 NOTE — PROGRESS NOTES
Name: Estrada Gusman      : 1990      MRN: [id number]  Encounter Provider: Manuel Fitzpatrick DO  Encounter Date: 2024   Encounter department: [facility] PRIMARY CARE  :  Assessment & Plan  Encounter for annual physical exam         EE (eosinophilic esophagitis)  Overall doing well with omeprazole 40 mg daily, continue to monitor.  He has no increased dysphagia.  He will see GI again in January.       Gastroesophageal reflux disease          Raynaud's disease  Bilateral hands, fortunately no ulcerations, previously saw a rheumatology and was advised to follow-up as needed, CRP less than 1, sed rate unremarkable, had negative SCAR, negative RF.         Leukopenia, unspecified type  WBC chronically runs around 2.7, he will be seeing hematology again in follow-up, see other testing.  Platelet count 145,000, hemoglobin within normal limits-continues on multivitamin with iron daily, has not received infusions recently       Burning sensation of feet  Inflammatory markers all normal, glucose is fine at 88, B12 level was normal at 684.  Continue to monitor.       Dermatitis  Continues with red scaly area submandibular on the left, has had other areas on scalp,  I would like him to use ketoconazole cream 2-3 times every day for 2 weeks on the area left submandibular, can follow that with small amount triamcinolone twice daily for 10 days, do not use triamcinolone long-term on facial area.  Has been avoiding shaving at area.    Consider dermatology, had seen them in the past regarding tinea cruris  Orders:    triamcinolone (KENALOG) 0.1 % cream; Apply topically 2 (two) times a day for 10 days    ketoconazole (NIZORAL) 2 % cream; Apply topically 2 (two) times a day for 14 days    Rash    Orders:    triamcinolone (KENALOG) 0.1 % cream; Apply topically 2 (two) times a day for 10 days    ketoconazole (NIZORAL) 2 % cream; Apply topically 2 (two) times a day for 14 days    Iron deficiency  Hemoglobin has  normalized, continues on multivitamin with iron, uses daily.  He will be seeing hematology in follow-up, see recent blood work         Immunization History   Administered Date(s) Administered    COVID-19 PFIZER VACCINE 0.3 ML IM 03/02/2021, 03/23/2021, 09/27/2021, 10/17/2023    COVID-19 Pfizer mRNA vacc PF kadeem-sucrose 12 yr and older (Comirnaty) 10/18/2024    INFLUENZA 11/07/2022, 10/18/2024    Influenza Quadrivalent Preservative Free 3 years and older IM 10/16/2014, 09/25/2017    Influenza Quadrivalent, 6-35 Months IM 10/07/2015, 10/03/2016    Influenza, injectable, quadrivalent, preservative free 0.5 mL 10/03/2019, 10/08/2020, 11/04/2021, 11/07/2022, 11/09/2023    Influenza, seasonal, injectable 10/03/2012, 10/09/2013    Tdap 10/27/2020   Immunizations are up-to-date.    He continues with routine exercise, healthy diet.    Has not required albuterol this year, call for prescription when needed.    We should continue to see him at least yearly.    ______________________________________         History of Present Illness     Is in today for a checkup, see previous visit with me in August.  He does continue with redness of hands, areas of rash left submandibular, hands, no ulcerations.  No increased joint pains.  Feet do burn at times.      Review of Systems   Constitutional:  Negative for chills, fatigue, fever and unexpected weight change.   HENT:  Negative for sore throat.    Respiratory:  Negative for shortness of breath and wheezing.    Cardiovascular:  Negative for chest pain, palpitations and leg swelling.   Gastrointestinal:         Still gets occasional reflux, is using omeprazole 40 mg daily which she does find to be helpful.  No increased dysphagia   Genitourinary:  Negative for dysuria and hematuria.   Musculoskeletal:  Positive for back pain (Minor, relates to years of biking, being active-does continue to exercise). Negative for arthralgias and neck pain.   Skin:  Positive for rash.   Neurological:   "Negative for dizziness, syncope, light-headedness and headaches.   Hematological:  Does not bruise/bleed easily.   Psychiatric/Behavioral:  Negative for behavioral problems.           Current Outpatient Medications:     albuterol (Ventolin HFA) 90 mcg/act inhaler, Inhale 1-2 puffs every 6 (six) hours as needed for wheezing, Disp: 8 g, Rfl: 0    ketoconazole (NIZORAL) 2 % cream, Apply topically 2 (two) times a day for 14 days, Disp: 30 g, Rfl: 0    levocetirizine (XYZAL) 5 MG tablet, Take 1 tablet (5 mg total) by mouth every evening ( for allergies ), Disp: 90 tablet, Rfl: 1    Multiple Vitamins-Iron (MULTIVITAMIN/IRON PO), Take by mouth daily, Disp: , Rfl:     omeprazole (PriLOSEC) 40 MG capsule, Take 1 capsule (40 mg total) by mouth daily On an empty stomach, Disp: 90 capsule, Rfl: 3    triamcinolone (KENALOG) 0.1 % cream, Apply topically 2 (two) times a day for 10 days, Disp: 15 g, Rfl: 1   Objective   /80 (BP Location: Left arm, Patient Position: Sitting, Cuff Size: Standard)   Pulse 81   Resp 12   Ht 6' 1\" (1.854 m)   Wt 70.9 kg (156 lb 3.2 oz)   SpO2 98%   BMI 20.61 kg/m²      Physical Exam  Constitutional:       Comments: Healthy thin 34-year-old seated no acute distress.  No scleral icterus, no neck mass, thyroid appears normal.  Heart was regular rate and rhythm without murmur, lungs were clear and equal, no vertebral tenderness.  Abdomen nontender.    No ankle edema.    Hands with uniform redness bilaterally, no ulcerations.    Has red slightly scaly area left submandibular, other faint red areas at hands, wrists.         "

## 2024-11-14 NOTE — ASSESSMENT & PLAN NOTE
Overall doing well with omeprazole 40 mg daily, continue to monitor.  He has no increased dysphagia.  He will see GI again in January.

## 2024-11-14 NOTE — PATIENT INSTRUCTIONS
Immunization History   Administered Date(s) Administered    COVID-19 PFIZER VACCINE 0.3 ML IM 03/02/2021, 03/23/2021, 09/27/2021, 10/17/2023    COVID-19 Pfizer mRNA vacc PF kadeem-sucrose 12 yr and older (Comirnaty) 10/18/2024    INFLUENZA 11/07/2022, 10/18/2024    Influenza Quadrivalent Preservative Free 3 years and older IM 10/16/2014, 09/25/2017    Influenza Quadrivalent, 6-35 Months IM 10/07/2015, 10/03/2016    Influenza, injectable, quadrivalent, preservative free 0.5 mL 10/03/2019, 10/08/2020, 11/04/2021, 11/07/2022, 11/09/2023    Influenza, seasonal, injectable 10/03/2012, 10/09/2013    Tdap 10/27/2020

## 2024-11-14 NOTE — ASSESSMENT & PLAN NOTE
Continues with red scaly area submandibular on the left, has had other areas on scalp,  I would like him to use ketoconazole cream 2-3 times every day for 2 weeks on the area left submandibular, can follow that with small amount triamcinolone twice daily for 10 days, do not use triamcinolone long-term on facial area.  Has been avoiding shaving at area.    Consider dermatology, had seen them in the past regarding tinea cruris  Orders:    triamcinolone (KENALOG) 0.1 % cream; Apply topically 2 (two) times a day for 10 days    ketoconazole (NIZORAL) 2 % cream; Apply topically 2 (two) times a day for 14 days

## 2024-11-14 NOTE — ASSESSMENT & PLAN NOTE
Hemoglobin has normalized, continues on multivitamin with iron, uses daily.  He will be seeing hematology in follow-up, see recent blood work

## 2024-11-14 NOTE — ASSESSMENT & PLAN NOTE
WBC chronically runs around 2.7, he will be seeing hematology again in follow-up, see other testing.  Platelet count 145,000, hemoglobin within normal limits-continues on multivitamin with iron daily, has not received infusions recently

## 2024-11-14 NOTE — ASSESSMENT & PLAN NOTE
Bilateral hands, fortunately no ulcerations, previously saw a rheumatology and was advised to follow-up as needed, CRP less than 1, sed rate unremarkable, had negative SCAR, negative RF.

## 2024-11-26 ENCOUNTER — OFFICE VISIT (OUTPATIENT)
Dept: HEMATOLOGY ONCOLOGY | Facility: CLINIC | Age: 34
End: 2024-11-26
Payer: COMMERCIAL

## 2024-11-26 VITALS
OXYGEN SATURATION: 99 % | SYSTOLIC BLOOD PRESSURE: 124 MMHG | BODY MASS INDEX: 20.81 KG/M2 | HEART RATE: 80 BPM | DIASTOLIC BLOOD PRESSURE: 68 MMHG | TEMPERATURE: 98.2 F | WEIGHT: 157 LBS | HEIGHT: 73 IN | RESPIRATION RATE: 16 BRPM

## 2024-11-26 DIAGNOSIS — D69.6 THROMBOCYTOPENIA (HCC): ICD-10-CM

## 2024-11-26 DIAGNOSIS — D70.9 NEUTROPENIA, UNSPECIFIED TYPE (HCC): Primary | ICD-10-CM

## 2024-11-26 PROCEDURE — 99214 OFFICE O/P EST MOD 30 MIN: CPT | Performed by: PHYSICIAN ASSISTANT

## 2024-11-26 NOTE — PATIENT INSTRUCTIONS
[facility] On[facility] Hematology Team  [place] - [phone]    Your Team Member:  Advanced Practitioner:  Lynn Heck PA-C    Please answer Private and Unavailable Calls - this may be your team(s) contacting you.  If you have medical questions/concerns/issues - contact us either by (1) My Chart (2) [facility]

## 2024-11-26 NOTE — PROGRESS NOTES
[address]  [facility] [place]  [address]  [facility] [id number]  Hematology Ambulatory Follow-Up  Estrada Gusman, 1990, [id number]  11/26/2024      Assessment and Plan   1. Neutropenia, unspecified type (HCC) (Primary); 2. Thrombocytopenia (HCC)  This is a 34-year-old male with past medical history of neutropenia and thrombocytopenia who underwent an extensive investigation regarding these 2 cell lines.  With both of these being very mild and the patient asymptomatic neutropenia as well as longstanding neutropenia and thrombocytopenia dating back to 2020 since routine blood work, I do not believe any additional testing is necessary including a bone marrow biopsy.  Patient demonstrates progression of the cell lines then bone marrow biopsy would be needed to rule out underlying dysplasia however, at this time patient has no B symptoms.    Following closely with hematology and now with stability of counts we will push out his follow-up appointment to 1 year.  Patient understands signs and symptoms that would require him to come back to the office sooner.  He is comfortable with using the [employer] as the primary means of communication.    Recommendation for follow-up in 1 year with blood work below.  Recommend routine follow-up with primary care.  - CBC and differential; Future  - Iron Panel (Includes Ferritin, Iron Sat%, Iron, and TIBC); Future  - Vitamin B12; Future  - Folate; Future    The patient is scheduled for follow-up in approximately 1 year.     Patient voiced agreement and understanding to the above.   Patient advised to call the Hematology/Oncology office with any questions and concerns regarding the above.    I have spent a total time of 31 minutes in caring for this patient on the day of the visit/encounter including Documenting in the medical record and Reviewing / ordering tests, medicine, procedures  .    Barrier(s) to care: None.  The patient is able  to self care.    Lynn Heck PA-C  Medical Oncology/Hematology  [facility]    Subjective     Chief Complaint   Patient presents with    Follow-up     History of present illness:   This is a 34-year-old male with past medical history of seasonal allergies and GERD- secondary to lower esophageal sphincter dysfunction diagnosed in 2021 who presents to the hematology clinic for evaluation of iron deficiency anemia and leukopenia.     Leukopenia:  No symptoms of leukopenia or B symptoms.  Patient does have some recurring fungal infections however he is an avid hiker and suffers from recurrent athlete's foot, occasionally will spread to the groin or to the trunk.  No social history contributory to viral etiology however, patient's mother required blood transfusions in the 80s.  She subsequently was diagnosed with hepatitis and treated accordingly.  No other details are known about this issue.  Blood transfusion occurred prior to patient's birth.  Trends:  8/30/2021 WBC 2.96, hemoglobin 14.9, MCV 98, platelets 132, ANC 1.04  8/3/2022 WBC 3.67, hemoglobin 13.9, MCV 98, platelets 164, no differential  5/18/2023 B12 = 636, folate = 21.4, WBC 2.3, hemoglobin 13.9, MCV 96, platelets 149, ANC 0.94 differential, within normal limits  6/23/2023 WBC 2.94, hemoglobin 14.1, platelet count 139, ANC 1.2  Negative chronic hepatitis, negative HIV  Recheck within normal limits, copper within normal limits, MMA within normal limits  Flow cytometry negative  9/15/2023 WBC 2.35, hemoglobin 13.4, MCV 99, platelet count 131        Iron deficiency anemia:  Never known issue prior to recent blood testing which was completed through GI office.  Endoscopy without signs or symptoms of chronic blood loss anemia, no history of colonoscopy.  Patient restricts diet with limited beef and pork.  However patient is conscious of this decision and takes a multivitamin regularly.  Patient has sleep disturbances, occasional  fatigue.  Patient mountain bikes and hikes frequently.  Family history noncontributory/limited with no known extended family on mother side and paternal grandfather passing early but of unknown causes.  See trends above.  Ferritin = 9, TIBC 433, iron saturation 25%  Fecal occult blood testing negative.  7/13 through 8/24/2023: Venofer 200 mg x 7 doses.  9/15/2023 WBC 2.35, hemoglobin 13.4, MCV 99, platelet count 131  Ferritin = 159     1/17/2024: WBC 2.2, hemoglobin 13.6, MCV 97, platelet count 134              ANC 0.90, rest of absolute differential WNL.              CMP and electrolyte abnormalities within normal limits or not clinically significant with an elevated CO2.              Iron saturation 44%, TIBC 305, ferritin 157     5/24/2024 WBC 2.11, hemoglobin 13.5, MCV 99, RDW 11.7, platelet count 147              ANC 0.84-rest of differential WNL              CMP-WNL              CRP/sed rate, RF, SCAR all negative              Flow cytometry negative for acute abnormality.              T-cell rearrangement studies pending.      Of note mother with severe rheumatologic illness: Lupus, Sojorn syndrome, Raynaud's phenomenon to name a few that the patient is aware of.  Patient's mother was diagnosed in late 50s.     8/19/2024: Evaluated by Dr. Melchor-negative findings.   Since this office visit in the fall 2024 patient was diagnosed with Raynaud's phenomenon.    11/12/2024 WBC 2.76, hemoglobin 13.8, MCV 95, platelet count 145   Ferritin 133, folate greater than 22 B12 684    Interval history: No B symptoms.  No bleeding easy bruising issues.  Patient notes that he is continuing on his normal diet.  Patient is now on GERD medication for 5 years.  Being observed by GI.    Review of Systems   Constitutional:  Positive for fatigue. Negative for activity change, appetite change, chills, fever and unexpected weight change.   HENT:  Negative for hearing loss, mouth sores, nosebleeds, sore throat, trouble swallowing and  voice change.    Eyes:  Negative for visual disturbance.   Respiratory:  Negative for cough and shortness of breath.    Cardiovascular:  Negative for chest pain, palpitations and leg swelling.   Gastrointestinal:  Negative for abdominal pain, blood in stool, constipation, diarrhea, nausea and vomiting.   Endocrine: Negative for cold intolerance.   Genitourinary:  Negative for decreased urine volume, dysuria and hematuria.   Musculoskeletal:  Negative for arthralgias and myalgias.   Skin:  Negative for rash.   Neurological:  Negative for dizziness, weakness, numbness and headaches.   Hematological:  Negative for adenopathy. Does not bruise/bleed easily.   Psychiatric/Behavioral:  Negative for dysphoric mood.      Patient Active Problem List   Diagnosis    Allergic rhinitis    Gastroesophageal reflux disease     Globus sensation throat    Leukocytopenia ( WBC 2.75 )    Tinea cruris    Iron deficiency    EE (eosinophilic esophagitis)    Raynaud's disease    Burning sensation of feet    Dermatitis     Past Medical History:   Diagnosis Date    Anemia     Asthma     Fungal rash of trunk 05/31/2023    GERD (gastroesophageal reflux disease)     Iron deficiency anemia      Past Surgical History:   Procedure Laterality Date    WISDOM TOOTH EXTRACTION       Family History   Problem Relation Age of Onset    Arthritis Mother     Colon polyps Father     Hypertension Father     Stroke Maternal Grandfather      Social History     Socioeconomic History    Marital status: Single     Spouse name: Not on file    Number of children: Not on file    Years of education: Not on file    Highest education level: Not on file   Occupational History    Occupation: enemplyed   Tobacco Use    Smoking status: Never     Passive exposure: Past (family members smoked over 10 years ago)    Smokeless tobacco: Never   Vaping Use    Vaping status: Never Used   Substance and Sexual Activity    Alcohol use: Never    Drug use: Never    Sexual activity: Not  "Currently     Partners: Female   Other Topics Concern    Not on file   Social History Narrative    Not on file     Social Drivers of Health     Financial Resource Strain: Not on file   Food Insecurity: Not on file   Transportation Needs: Not on file   Physical Activity: Not on file   Stress: Not on file   Social Connections: Not on file   Intimate Partner Violence: Not on file   Housing Stability: Not on file       Current Outpatient Medications:     albuterol (Ventolin HFA) 90 mcg/act inhaler, Inhale 1-2 puffs every 6 (six) hours as needed for wheezing, Disp: 8 g, Rfl: 0    ketoconazole (NIZORAL) 2 % cream, Apply topically 2 (two) times a day for 14 days, Disp: 30 g, Rfl: 0    levocetirizine (XYZAL) 5 MG tablet, Take 1 tablet (5 mg total) by mouth every evening ( for allergies ), Disp: 90 tablet, Rfl: 1    Multiple Vitamins-Iron (MULTIVITAMIN/IRON PO), Take by mouth daily, Disp: , Rfl:     omeprazole (PriLOSEC) 40 MG capsule, Take 1 capsule (40 mg total) by mouth daily On an empty stomach, Disp: 90 capsule, Rfl: 3    triamcinolone (KENALOG) 0.1 % cream, Apply topically 2 (two) times a day for 10 days, Disp: 15 g, Rfl: 1  Allergies   Allergen Reactions    Contrast Dye [Iodinated Contrast Media] Rash     'Red dots neck to feet day after CT w dye'     Objective   /68 (BP Location: Right arm, Patient Position: Sitting, Cuff Size: Adult)   Pulse 80   Temp 98.2 °F (36.8 °C) (Temporal)   Resp 16   Ht 6' 1\" (1.854 m)   Wt 71.2 kg (157 lb)   SpO2 99%   BMI 20.71 kg/m²    Physical Exam  Constitutional:       General: He is not in acute distress.     Appearance: He is well-developed.   HENT:      Head: Normocephalic and atraumatic.      Right Ear: External ear normal.      Left Ear: External ear normal.      Nose: Nose normal.   Eyes:      General: No scleral icterus.     Conjunctiva/sclera: Conjunctivae normal.   Cardiovascular:      Rate and Rhythm: Normal rate.   Pulmonary:      Effort: No respiratory " distress.   Abdominal:      General: There is no distension.      Palpations: Abdomen is soft.   Skin:     Findings: No rash (on exposed skin.).   Neurological:      Mental Status: He is alert and oriented to person, place, and time.   Psychiatric:         Thought Content: Thought content normal.         Result Review  Labs:  Appointment on 11/12/2024   Component Date Value Ref Range Status    WBC 11/12/2024 2.76 (L)  4.31 - 10.16 Thousand/uL Final    RBC 11/12/2024 4.21  3.88 - 5.62 Million/uL Final    Hemoglobin 11/12/2024 13.8  12.0 - 17.0 g/dL Final    Hematocrit 11/12/2024 40.1  36.5 - 49.3 % Final    MCV 11/12/2024 95  82 - 98 fL Final    MCH 11/12/2024 32.8  26.8 - 34.3 pg Final    MCHC 11/12/2024 34.4  31.4 - 37.4 g/dL Final    RDW 11/12/2024 11.5 (L)  11.6 - 15.1 % Final    MPV 11/12/2024 12.6  8.9 - 12.7 fL Final    Platelets 11/12/2024 145 (L)  149 - 390 Thousands/uL Final    nRBC 11/12/2024 0  /100 WBCs Final    Segmented % 11/12/2024 42 (L)  43 - 75 % Final    Immature Grans % 11/12/2024 0  0 - 2 % Final    Lymphocytes % 11/12/2024 40  14 - 44 % Final    Monocytes % 11/12/2024 16 (H)  4 - 12 % Final    Eosinophils Relative 11/12/2024 2  0 - 6 % Final    Basophils Relative 11/12/2024 0  0 - 1 % Final    Absolute Neutrophils 11/12/2024 1.17 (L)  1.85 - 7.62 Thousands/µL Final    Absolute Immature Grans 11/12/2024 0.00  0.00 - 0.20 Thousand/uL Final    Absolute Lymphocytes 11/12/2024 1.10  0.60 - 4.47 Thousands/µL Final    Absolute Monocytes 11/12/2024 0.43  0.17 - 1.22 Thousand/µL Final    Eosinophils Absolute 11/12/2024 0.05  0.00 - 0.61 Thousand/µL Final    Basophils Absolute 11/12/2024 0.01  0.00 - 0.10 Thousands/µL Final    Sodium 11/12/2024 140  135 - 147 mmol/L Final    Potassium 11/12/2024 4.0  3.5 - 5.3 mmol/L Final    Chloride 11/12/2024 102  96 - 108 mmol/L Final    CO2 11/12/2024 30  21 - 32 mmol/L Final    ANION GAP 11/12/2024 8  4 - 13 mmol/L Final    BUN 11/12/2024 21  5 - 25 mg/dL Final     Creatinine 11/12/2024 0.77  0.60 - 1.30 mg/dL Final    Standardized to IDMS reference method    Glucose, Fasting 11/12/2024 88  65 - 99 mg/dL Final    Calcium 11/12/2024 9.1  8.4 - 10.2 mg/dL Final    AST 11/12/2024 19  13 - 39 U/L Final    ALT 11/12/2024 8  7 - 52 U/L Final    Specimen collection should occur prior to Sulfasalazine administration due to the potential for falsely depressed results.     Alkaline Phosphatase 11/12/2024 50  34 - 104 U/L Final    Total Protein 11/12/2024 7.0  6.4 - 8.4 g/dL Final    Albumin 11/12/2024 4.5  3.5 - 5.0 g/dL Final    Total Bilirubin 11/12/2024 0.77  0.20 - 1.00 mg/dL Final    Use of this assay is not recommended for patients undergoing treatment with eltrombopag due to the potential for falsely elevated results.  N-acetyl-p-benzoquinone imine (metabolite of Acetaminophen) will generate erroneously low results in samples for patients that have taken an overdose of Acetaminophen.    eGFR 11/12/2024 117  ml/min/1.73sq m Final    Vitamin B-12 11/12/2024 684  180 - 914 pg/mL Final    Folate 11/12/2024 >22.3  >5.9 ng/mL Final    The World Health Organization has determined deficient folate concentrations are considered to be <4.0 ng/mL.    CRP 11/12/2024 <1.0  <3.0 mg/L Final    Iron Saturation 11/12/2024 38  15 - 50 % Final    TIBC 11/12/2024 320  250 - 450 ug/dL Final    Iron 11/12/2024 120  50 - 212 ug/dL Final    Patients treated with metal-binding drugs (ie. Deferoxamine) may have depressed iron values.    UIBC 11/12/2024 200  155 - 355 ug/dL Final    Ferritin 11/12/2024 133  24 - 336 ng/mL Final       Please note:  This report has been generated by a voice recognition software system. Therefore there may be syntax, spelling, and/or grammatical errors. Please call if you have any questions.

## 2025-01-06 NOTE — PROGRESS NOTES
[address]  [facility] HEMATOLOGY ONCOLOGY SPECIALISTS [place]  [address]  [facility] [address]  Hematology Ambulatory Follow-Up  Estrada Gusman, 1990, [id number]  5/30/2024    Assessment and Plan   1.  Neutropenia, unspecified type; 2. Thrombocytopenia (HCC); 3. Family history of systemic lupus erythematosus (SLE) in mother  This is a 33-year-old male with history of longstanding neutropenia, ANC of 0.84.    Typical ANC ranged between 0.9 and 1.2 over the past year.     Patient also has borderline thrombocytopenia with the present platelet count in the 140,000/unit liter.  Patient is significant family history of lupus in his mother who also suffers from other rheumatologic diagnoses including Sojourn syndrome.      Additional blood testing demonstrated stability with mild decrease of T cells.  If T-cell rearrangement is found, patient likely has an underlying or provoking rheumatologic diagnoses, and referral to rheumatology will be placed.  Inflammatory markers as well as SCAR have been completed and were negative.  Renal ultrasound was negative for splenomegaly.    Regardless, I discussed with the patient that if there is progressive neutropenia with an ANC at 500/unit liter, bone marrow biopsy would be indicated however at this time since the patient is asymptomatic, I do not believe that is necessary especially with a normal hemoglobin and very mild thrombocytopenia.    Patient will follow-up in 6 months with blood work prior.  Patient was in agreement with her plan above.  Patient will contact the office if he develops any symptoms.  Patient has no B symptoms at this time.    - CBC and differential; Future  - Comprehensive metabolic panel; Future  - Vitamin B12; Future  - Folate; Future  - C-reactive protein; Future  - Iron Panel (Includes Ferritin, Iron Sat%, Iron, and TIBC); Future    4. Iron deficiency  Patient had history of GERD, iron deficiency likely secondary to this.    Iron  The  services used.Called pt x 2  no answer but able to leave a vm. Calling pt to inform her that her appt would need to rescheduled for tomorrow due to the weather. In vm told pt to call to reschedule and left number to call.NISH ROMERO RN     studies have been stable since treatment.      - CBC and differential; Future  - Comprehensive metabolic panel; Future  - Vitamin B12; Future  - Folate; Future  - C-reactive protein; Future  - Iron Panel (Includes Ferritin, Iron Sat%, Iron, and TIBC); Future    The patient is scheduled for follow-up in approximately 4 months.     Patient voiced agreement and understanding to the above.   Patient advised to call the Hematology/Oncology office with any questions and concerns regarding the above.    Barrier(s) to care: None  The patient is able to self care.    Lynn Heck PA-C  Medical Oncology/Hematology  [facility]    Subjective     Chief Complaint   Patient presents with    Follow-up       History of present illness:   This is a 33-year-old male with past medical history of seasonal allergies and GERD- secondary to lower esophageal sphincter dysfunction diagnosed in 2021 who presents to the hematology clinic for evaluation of iron deficiency anemia and leukopenia.     Leukopenia:  No symptoms of leukopenia or B symptoms.  Patient does have some recurring fungal infections however he is an avid hiker and suffers from recurrent athlete's foot, occasionally will spread to the groin or to the trunk.  No social history contributory to viral etiology however, patient's mother required blood transfusions in the 80s.  She subsequently was diagnosed with hepatitis and treated accordingly.  No other details are known about this issue.  Blood transfusion occurred prior to patient's birth.  Trends:  8/30/2021 WBC 2.96, hemoglobin 14.9, MCV 98, platelets 132, ANC 1.04  8/3/2022 WBC 3.67, hemoglobin 13.9, MCV 98, platelets 164, no differential  5/18/2023 B12 = 636, folate = 21.4, WBC 2.3, hemoglobin 13.9, MCV 96, platelets 149, ANC 0.94 differential, within normal limits  6/23/2023 WBC 2.94, hemoglobin 14.1, platelet count 139, ANC 1.2  Negative chronic hepatitis, negative HIV  Recheck within  normal limits, copper within normal limits, MMA within normal limits  Flow cytometry negative  9/15/2023 WBC 2.35, hemoglobin 13.4, MCV 99, platelet count 131       Iron deficiency anemia:  Never known issue prior to recent blood testing which was completed through GI office.  Endoscopy without signs or symptoms of chronic blood loss anemia, no history of colonoscopy.  Patient restricts diet with limited beef and pork.  However patient is conscious of this decision and takes a multivitamin regularly.  Patient has sleep disturbances, occasional fatigue.  Patient mountain bikes and hikes frequently.  Family history noncontributory/limited with no known extended family on mother side and paternal grandfather passing early but of unknown causes.  See trends above.  Ferritin = 9, TIBC 433, iron saturation 25%  Fecal occult blood testing negative.  7/13 through 8/24/2023: Venofer 200 mg x 7 doses.  9/15/2023 WBC 2.35, hemoglobin 13.4, MCV 99, platelet count 131  Ferritin = 159    1/17/2024: WBC 2.2, hemoglobin 13.6, MCV 97, platelet count 134   ANC 0.90, rest of absolute differential WNL.   CMP and electrolyte abnormalities within normal limits or not clinically significant with an elevated CO2.   Iron saturation 44%, TIBC 305, ferritin 157    5/24/2024 WBC 2.11, hemoglobin 13.5, MCV 99, RDW 11.7, platelet count 147   ANC 0.84-rest of differential WNL   CMP-WNL   CRP/sed rate, RF, SCAR all negative   Flow cytometry negative for acute abnormality.   T-cell rearrangement studies pending.    Interval history: No symptoms.  Patient continues to hike.  No fatigue, no hand stiffness, arthralgias, dry moutth.  + rashes around the eyes and face treated with topical steriod through pcp- no evidence today.    Of note mother with severe rheumatologic illness: Lupus, Sojourn syndrome, Raynaud's phenomenon to name a few that the patient is aware of.  Patient's mother was diagnosed in late 50s.     Review of Systems   Constitutional:   Negative for activity change, appetite change, fatigue and fever.   HENT:  Negative for nosebleeds.    Respiratory:  Negative for cough, choking and shortness of breath.    Cardiovascular:  Negative for chest pain, palpitations and leg swelling.   Gastrointestinal:  Negative for abdominal distention, abdominal pain, anal bleeding, blood in stool, constipation, diarrhea, nausea and vomiting.   Endocrine: Negative for cold intolerance.   Genitourinary:  Negative for hematuria.   Musculoskeletal:  Negative for myalgias.   Skin:  Negative for color change, pallor and rash.   Allergic/Immunologic: Negative for immunocompromised state.   Neurological:  Negative for headaches.   Hematological:  Negative for adenopathy. Does not bruise/bleed easily.   All other systems reviewed and are negative.      Patient Active Problem List   Diagnosis    Mild intermittent asthma without complication    Allergic rhinitis    Gastroesophageal reflux disease     Globus sensation throat    Leukocytopenia ( WBC 2.75 )    Tinea cruris    Fungal rash of trunk    Iron deficiency    Leukopenia    EE (eosinophilic esophagitis)     Past Medical History:   Diagnosis Date    Asthma     GERD (gastroesophageal reflux disease)     Iron deficiency anemia      Past Surgical History:   Procedure Laterality Date    WISDOM TOOTH EXTRACTION       Family History   Problem Relation Age of Onset    Arthritis Mother     Colon polyps Father     Hypertension Father     Stroke Maternal Grandfather      Social History     Socioeconomic History    Marital status: Single     Spouse name: Not on file    Number of children: Not on file    Years of education: Not on file    Highest education level: Not on file   Occupational History    Occupation: enemplyed   Tobacco Use    Smoking status: Never     Passive exposure: Past (family members smoked over 10 years ago)    Smokeless tobacco: Never   Vaping Use    Vaping status: Never Used   Substance and Sexual Activity     "Alcohol use: Never    Drug use: Never    Sexual activity: Not Currently     Partners: Female   Other Topics Concern    Not on file   Social History Narrative    Not on file     Social Determinants of Health     Financial Resource Strain: Not on file   Food Insecurity: Not on file   Transportation Needs: Not on file   Physical Activity: Not on file   Stress: Not on file   Social Connections: Not on file   Intimate Partner Violence: Not on file   Housing Stability: Not on file       Current Outpatient Medications:     Butenafine HCl 1 % cream, Apply topically 2 (two) times a day, Disp: , Rfl:     pantoprazole (PROTONIX) 40 mg tablet, TAKE ONE TABLET BY MOUTH TWICE A DAY, Disp: 180 tablet, Rfl: 1    albuterol (Ventolin HFA) 90 mcg/act inhaler, Inhale 1-2 puffs every 6 (six) hours as needed for wheezing (Patient not taking: Reported on 11/9/2023), Disp: 8 g, Rfl: 0    ketoconazole (NIZORAL) 2 % cream, Apply topically 2 (two) times a day (Patient not taking: Reported on 11/9/2023), Disp: 15 g, Rfl: 0    levocetirizine (XYZAL) 5 MG tablet, Take 1 tablet (5 mg total) by mouth every evening ( for allergies ) (Patient not taking: Reported on 11/9/2023), Disp: 90 tablet, Rfl: 1    triamcinolone (KENALOG) 0.1 % cream, Apply topically 2 (two) times a day (Patient not taking: Reported on 11/9/2023), Disp: 30 g, Rfl: 0  No Known Allergies    Objective   /70 (BP Location: Left arm)   Pulse 71   Temp 98.3 °F (36.8 °C) (Tympanic)   Resp 18   Ht 6' 1\" (1.854 m)   SpO2 99%   BMI 20.37 kg/m²    Physical Exam  Constitutional:       General: He is not in acute distress.     Appearance: He is well-developed.   HENT:      Head: Normocephalic and atraumatic.      Right Ear: External ear normal.      Left Ear: External ear normal.      Nose: Nose normal.   Eyes:      General: No scleral icterus.     Conjunctiva/sclera: Conjunctivae normal.   Cardiovascular:      Rate and Rhythm: Normal rate.   Pulmonary:      Effort: No " respiratory distress.   Abdominal:      General: There is no distension.      Palpations: Abdomen is soft.   Skin:     Findings: No rash (on exposed skin.).   Neurological:      Mental Status: He is alert and oriented to person, place, and time.   Psychiatric:         Thought Content: Thought content normal.         Result Review  Labs:  Appointment on 05/24/2024   Component Date Value Ref Range Status    WBC 05/24/2024 2.11 (L)  4.31 - 10.16 Thousand/uL Final    RBC 05/24/2024 4.07  3.88 - 5.62 Million/uL Final    Hemoglobin 05/24/2024 13.5  12.0 - 17.0 g/dL Final    Hematocrit 05/24/2024 40.4  36.5 - 49.3 % Final    MCV 05/24/2024 99 (H)  82 - 98 fL Final    MCH 05/24/2024 33.2  26.8 - 34.3 pg Final    MCHC 05/24/2024 33.4  31.4 - 37.4 g/dL Final    RDW 05/24/2024 11.7  11.6 - 15.1 % Final    MPV 05/24/2024 12.2  8.9 - 12.7 fL Final    Platelets 05/24/2024 147 (L)  149 - 390 Thousands/uL Final    nRBC 05/24/2024 0  /100 WBCs Final    Segmented % 05/24/2024 40 (L)  43 - 75 % Final    Immature Grans % 05/24/2024 1  0 - 2 % Final    Lymphocytes % 05/24/2024 42  14 - 44 % Final    Monocytes % 05/24/2024 13 (H)  4 - 12 % Final    Eosinophils Relative 05/24/2024 3  0 - 6 % Final    Basophils Relative 05/24/2024 1  0 - 1 % Final    Absolute Neutrophils 05/24/2024 0.84 (L)  1.85 - 7.62 Thousands/µL Final    Absolute Immature Grans 05/24/2024 0.01  0.00 - 0.20 Thousand/uL Final    Absolute Lymphocytes 05/24/2024 0.91  0.60 - 4.47 Thousands/µL Final    Absolute Monocytes 05/24/2024 0.28  0.17 - 1.22 Thousand/µL Final    Eosinophils Absolute 05/24/2024 0.06  0.00 - 0.61 Thousand/µL Final    Basophils Absolute 05/24/2024 0.01  0.00 - 0.10 Thousands/µL Final    Sodium 05/24/2024 141  135 - 147 mmol/L Final    Potassium 05/24/2024 4.3  3.5 - 5.3 mmol/L Final    Chloride 05/24/2024 103  96 - 108 mmol/L Final    CO2 05/24/2024 31  21 - 32 mmol/L Final    ANION GAP 05/24/2024 7  4 - 13 mmol/L Final    BUN 05/24/2024 20  5 - 25  mg/dL Final    Creatinine 05/24/2024 0.77  0.60 - 1.30 mg/dL Final    Standardized to IDMS reference method    Glucose, Fasting 05/24/2024 92  65 - 99 mg/dL Final    Calcium 05/24/2024 9.2  8.4 - 10.2 mg/dL Final    AST 05/24/2024 21  13 - 39 U/L Final    ALT 05/24/2024 9  7 - 52 U/L Final    Specimen collection should occur prior to Sulfasalazine administration due to the potential for falsely depressed results.     Alkaline Phosphatase 05/24/2024 52  34 - 104 U/L Final    Total Protein 05/24/2024 6.9  6.4 - 8.4 g/dL Final    Albumin 05/24/2024 4.5  3.5 - 5.0 g/dL Final    Total Bilirubin 05/24/2024 0.77  0.20 - 1.00 mg/dL Final    Use of this assay is not recommended for patients undergoing treatment with eltrombopag due to the potential for falsely elevated results.  N-acetyl-p-benzoquinone imine (metabolite of Acetaminophen) will generate erroneously low results in samples for patients that have taken an overdose of Acetaminophen.    eGFR 05/24/2024 117  ml/min/1.73sq m Final    Rheumatoid Factor 05/24/2024 Negative  Negative Final    Sed Rate 05/24/2024 <1  0 - 14 mm/hour Final    CRP 05/24/2024 <1.0  <3.0 mg/L Final    SCAR 05/24/2024 Negative  Negative Final    Scan Result 05/24/2024 SEE WRITTEN REPORT   Final       Please note:  This report has been generated by a voice recognition software system. Therefore there may be syntax, spelling, and/or grammatical errors. Please call if you have any questions.

## 2025-01-22 ENCOUNTER — OFFICE VISIT (OUTPATIENT)
Dept: GASTROENTEROLOGY | Facility: CLINIC | Age: 35
End: 2025-01-22
Payer: COMMERCIAL

## 2025-01-22 VITALS
DIASTOLIC BLOOD PRESSURE: 60 MMHG | WEIGHT: 160.4 LBS | BODY MASS INDEX: 21.16 KG/M2 | TEMPERATURE: 98.2 F | SYSTOLIC BLOOD PRESSURE: 124 MMHG

## 2025-01-22 DIAGNOSIS — R13.10 DYSPHAGIA, UNSPECIFIED TYPE: ICD-10-CM

## 2025-01-22 DIAGNOSIS — K20.0 EOSINOPHILIC ESOPHAGITIS: ICD-10-CM

## 2025-01-22 DIAGNOSIS — K21.9 GASTROESOPHAGEAL REFLUX DISEASE WITHOUT ESOPHAGITIS: Primary | ICD-10-CM

## 2025-01-22 PROCEDURE — 99214 OFFICE O/P EST MOD 30 MIN: CPT | Performed by: INTERNAL MEDICINE

## 2025-01-22 RX ORDER — SODIUM CHLORIDE, SODIUM LACTATE, POTASSIUM CHLORIDE, CALCIUM CHLORIDE 600; 310; 30; 20 MG/100ML; MG/100ML; MG/100ML; MG/100ML
125 INJECTION, SOLUTION INTRAVENOUS CONTINUOUS
OUTPATIENT
Start: 2025-01-22

## 2025-01-22 NOTE — PATIENT INSTRUCTIONS
Dupixent (for treatment of asthma, esophageal eosinophila)    Manometry scheduled for 2/6/25 at [facility] for 11 am

## 2025-01-23 NOTE — ASSESSMENT & PLAN NOTE
Improved with PPI.  He is hesitant to go up to twice daily for the time being.  Further evaluation with manometry given mild dysphagia and 24-hour pH impedance  Orders:  •  Esoph manometry/24hr ph; Future

## 2025-01-23 NOTE — PROGRESS NOTES
Name: Estrada Gusman      : 1990      MRN: [id number]  Encounter Provider: Soni Sauceda DO  Encounter Date: 2025   Encounter department: [facility]OLOG[facility] [place]  :  Assessment & Plan  Gastroesophageal reflux disease without esophagitis  Improved with PPI.  He is hesitant to go up to twice daily for the time being.  Further evaluation with manometry given mild dysphagia and 24-hour pH impedance  Orders:  •  Esoph manometry/24hr ph; Future    Dysphagia, unspecified type  Mild intermittent tightness with swallowing without regurgitation or food impaction.  This has been improved with PPI but not completely resolved.  Discussed different options and confounding factors that may be contributing to dysphagia including eosinophilia and GERD.  Did discuss trialing increasing PPI to twice daily but he would like to hold off on that for now.  Will plan for esophageal manometry 24-hour pH for further guidance.  Orders:  •  Esoph manometry/24hr ph; Future    Eosinophilic esophagitis  Borderline elevation of eosinophils technically meeting criteria at 15 eosinophils per high-power field however would favor that this is PPI responsive eosinophilia given better improvement with PPI over a trial of fluticasone in the past.  We did discuss that if above manometry and 24-hour pH studies were overall unremarkable and he continues to have intermittent tightness with initiation of swallows, could consider Dupixent for eosinophilia especially given presence of asthma/allergic rhinitis history.           History of Present Illness   Abdominal Pain      Estrada Gusman is a 34 y.o. male who presents  history of asthma, allergic rhinitis presenting for follow up regarding EOE and GERD.     He is overall doing well on omeprazole once daily.  He does continue to have intermittent feelings of tightness with swallowing sometimes.  This is not a common occurrence and food does go down and  he denies any history of regurgitating food or impaction like symptoms.     Summary of HPI:    He was initially seen for about 6 weeks duration of significant symptoms of heartburn as well as dysphagia.  At time of upper endoscopy he had been on Protonix 40 mg b.i.d. For roughly 2 months.   Endoscopically, upper endoscopy was unremarkable.  On pathology he was noted to have 10 eosinophils per high-power field, and on proximal esophagus biopsies he had up to 15 eosinophils per high-power field.     He did not respond to a trial of swallowed fluticasone thus it was switch to PPI alone which he has been taking b.i.d..  Over the past several months of symptoms have been better controlled, with only occasional time periods of breakthrough heartburn complaints that are responsive to antacids usually Tums.      Review of Systems   Gastrointestinal:  Positive for abdominal pain.          Objective   /60 (BP Location: Right arm, Patient Position: Sitting, Cuff Size: Standard)   Temp 98.2 °F (36.8 °C) (Tympanic)   Wt 72.8 kg (160 lb 6.4 oz)   BMI 21.16 kg/m²      Physical Exam

## 2025-01-24 ENCOUNTER — OFFICE VISIT (OUTPATIENT)
Dept: FAMILY MEDICINE CLINIC | Facility: CLINIC | Age: 35
End: 2025-01-24
Payer: COMMERCIAL

## 2025-01-24 VITALS
BODY MASS INDEX: 21.05 KG/M2 | RESPIRATION RATE: 18 BRPM | SYSTOLIC BLOOD PRESSURE: 124 MMHG | HEART RATE: 63 BPM | DIASTOLIC BLOOD PRESSURE: 68 MMHG | HEIGHT: 73 IN | OXYGEN SATURATION: 100 % | WEIGHT: 158.8 LBS | TEMPERATURE: 97.3 F

## 2025-01-24 DIAGNOSIS — K21.9 GASTROESOPHAGEAL REFLUX DISEASE WITHOUT ESOPHAGITIS: ICD-10-CM

## 2025-01-24 DIAGNOSIS — K20.0 EE (EOSINOPHILIC ESOPHAGITIS): ICD-10-CM

## 2025-01-24 DIAGNOSIS — G58.9 PERIPHERAL NERVE ENTRAPMENT SYNDROME: Primary | ICD-10-CM

## 2025-01-24 DIAGNOSIS — L30.9 DERMATITIS: ICD-10-CM

## 2025-01-24 DIAGNOSIS — I73.00 RAYNAUD'S DISEASE WITHOUT GANGRENE: ICD-10-CM

## 2025-01-24 PROCEDURE — 99214 OFFICE O/P EST MOD 30 MIN: CPT | Performed by: FAMILY MEDICINE

## 2025-01-24 NOTE — ASSESSMENT & PLAN NOTE
He would certainly seem to be a candidate for low-dose amlodipine with chronic Raynaud's.  He would like to hold off on that at this time.

## 2025-01-24 NOTE — ASSESSMENT & PLAN NOTE
He does have an intermittent dermatitis of his face consistent with most likely eczema.  I suggested  We try just 1% hydrocortisone twice daily.  If this is not working, we will add in the antifungal.

## 2025-01-24 NOTE — ASSESSMENT & PLAN NOTE
He seems to have a left lateral leg entrapment syndrome.  He is having just some intermittent tingling and burning.  Fortunately, strength is intact.  Hold on EMG for now but certainly consider EMG if symptoms are progressive or if he is experiencing any weakness, foot drop etc.

## 2025-01-24 NOTE — PROGRESS NOTES
Name: Estrada Gusman      : 1990      MRN: [id number]  Encounter Provider: Adan Castañeda Jr, MD  Encounter Date: 2025   Encounter department: [facility] PRIMARY CARE  :  Assessment & Plan  Peripheral nerve entrapment syndrome  He seems to have a left lateral leg entrapment syndrome.  He is having just some intermittent tingling and burning.  Fortunately, strength is intact.  Hold on EMG for now but certainly consider EMG if symptoms are progressive or if he is experiencing any weakness, foot drop etc.       EE (eosinophilic esophagitis)         Raynaud's disease  He would certainly seem to be a candidate for low-dose amlodipine with chronic Raynaud's.  He would like to hold off on that at this time.       Dermatitis  He does have an intermittent dermatitis of his face consistent with most likely eczema.  I suggested  We try just 1% hydrocortisone twice daily.  If this is not working, we will add in the antifungal.         Gastroesophageal reflux disease                 History of Present Illness   Patient presents today concern regarding intermittent tingling in burning in his lateral left leg.  This happens from the knee down to his lateral ankle.  He denies any weakness he is had no trauma.  He denies any fever or chills.  He also has Raynaud's which has been relatively active in this cold winter.  He has not been treated with medication for this in the past.  He has a history of reflux and is following closely with GI.  He is continued on omeprazole and denies dysphagia currently.  He has a history of some intermittent dry patches on his face for which she has been using triamcinolone as well as ketoconazole intermittently.  This does seem to work.    Leg Pain       Review of Systems   Constitutional:  Negative for appetite change, chills, fatigue, fever and unexpected weight change.   HENT:  Negative for trouble swallowing.    Eyes:  Negative for visual disturbance.  "  Respiratory:  Negative for cough, chest tightness, shortness of breath and wheezing.    Cardiovascular:  Negative for chest pain, palpitations and leg swelling.   Gastrointestinal:  Negative for abdominal distention, abdominal pain, blood in stool, constipation and diarrhea.   Endocrine: Negative for polyuria.   Genitourinary:  Negative for difficulty urinating and flank pain.   Musculoskeletal:  Negative for arthralgias and myalgias.   Skin:  Negative for rash.   Neurological:  Negative for dizziness and light-headedness.   Hematological:  Negative for adenopathy. Does not bruise/bleed easily.   Psychiatric/Behavioral:  Negative for dysphoric mood and sleep disturbance. The patient is not nervous/anxious.        Objective   /68 (BP Location: Right arm, Patient Position: Sitting, Cuff Size: Standard)   Pulse 63   Temp (!) 97.3 °F (36.3 °C) (Tympanic)   Resp 18   Ht 6' 1\" (1.854 m)   Wt 72 kg (158 lb 12.8 oz)   SpO2 100%   BMI 20.95 kg/m²      Physical Exam  Constitutional:       General: He is not in acute distress.     Appearance: Normal appearance. He is well-developed. He is not diaphoretic.   HENT:      Head: Normocephalic.      Right Ear: External ear normal.      Left Ear: External ear normal.      Nose: Nose normal.   Eyes:      General:         Right eye: No discharge.         Left eye: No discharge.      Conjunctiva/sclera: Conjunctivae normal.      Pupils: Pupils are equal, round, and reactive to light.   Neck:      Thyroid: No thyromegaly.      Trachea: No tracheal deviation.   Cardiovascular:      Rate and Rhythm: Normal rate and regular rhythm.      Heart sounds: Normal heart sounds. No murmur heard.     No friction rub.   Pulmonary:      Effort: Pulmonary effort is normal. No respiratory distress.      Breath sounds: Normal breath sounds. No wheezing.   Chest:      Chest wall: No tenderness.   Abdominal:      General: There is no distension.      Palpations: There is no mass.      " Tenderness: There is no abdominal tenderness. There is no guarding or rebound.      Hernia: No hernia is present.   Musculoskeletal:         General: No swelling or deformity.      Cervical back: Normal range of motion.      Right lower leg: No edema.      Left lower leg: No edema.   Lymphadenopathy:      Cervical: No cervical adenopathy.   Skin:     Findings: Erythema and rash present.      Comments: He has him changes consistent with chronic Raynaud's of his fingers diffusely.  He also has what appears to be healing folliculitis on the dorsum of bilateral feet   Neurological:      General: No focal deficit present.      Mental Status: He is alert.      Cranial Nerves: No cranial nerve deficit.      Coordination: Coordination normal.   Psychiatric:         Thought Content: Thought content normal.

## 2025-01-31 ENCOUNTER — TELEPHONE (OUTPATIENT)
Dept: GASTROENTEROLOGY | Facility: HOSPITAL | Age: 35
End: 2025-01-31

## 2025-02-03 ENCOUNTER — TELEPHONE (OUTPATIENT)
Dept: GASTROENTEROLOGY | Facility: HOSPITAL | Age: 35
End: 2025-02-03

## 2025-02-06 ENCOUNTER — HOSPITAL ENCOUNTER (OUTPATIENT)
Dept: GASTROENTEROLOGY | Facility: HOSPITAL | Age: 35
End: 2025-02-06
Attending: INTERNAL MEDICINE
Payer: COMMERCIAL

## 2025-02-06 VITALS
DIASTOLIC BLOOD PRESSURE: 62 MMHG | OXYGEN SATURATION: 99 % | RESPIRATION RATE: 18 BRPM | TEMPERATURE: 98.7 F | HEART RATE: 70 BPM | SYSTOLIC BLOOD PRESSURE: 122 MMHG

## 2025-02-06 DIAGNOSIS — R13.10 DYSPHAGIA, UNSPECIFIED TYPE: ICD-10-CM

## 2025-02-06 DIAGNOSIS — K21.9 GASTROESOPHAGEAL REFLUX DISEASE WITHOUT ESOPHAGITIS: ICD-10-CM

## 2025-02-06 PROCEDURE — 91038 ESOPH IMPED FUNCT TEST > 1HR: CPT

## 2025-02-06 PROCEDURE — 91010 ESOPHAGUS MOTILITY STUDY: CPT

## 2025-02-06 NOTE — PERIOPERATIVE NURSING NOTE
Patient brought in the room and  was educated on procedure. Lidocaine 2% administered to bilateral nostrils by sniffing the Lidocaine up and through the nasal passages. Medication was allowed to take affect. Manometry catheter placed via left nostril,positioned and secured. Patient did cough with insertion and was spitting out mucous. A 5 minute acclimation period was given and a resting measurement obtained. Patient was placed in the supine position. 10 liquid swallows, 10 viscous swallow.  After all the liquid and viscous swallows and swallow was allowed in between attempts. 1 rapid swallow was performed.  Patient then placed upright on the stretcher and acclimation period given. ,5 upright swallows and 1 rapid drink challenge with 190 cc of water tolerated over a 30 second time frame performed. The catheter was slightly pulled out a UES resting measurement was obtained and 5 UES swallows were performed.  Patient tolerated procedure and catheter removed intact. PH probe inserted via the left nostril and secured. Zephr recorder teach back performed and patient verbalized understanding. Patient instructed to return next day to have with the PH log sheet to have the probe removed.  Discharge instructions given and patient ambulated out of the room in stable condition.

## 2025-02-07 PROCEDURE — 91038 ESOPH IMPED FUNCT TEST > 1HR: CPT | Performed by: INTERNAL MEDICINE

## 2025-02-07 PROCEDURE — 91010 ESOPHAGUS MOTILITY STUDY: CPT | Performed by: INTERNAL MEDICINE

## 2025-02-11 ENCOUNTER — RESULTS FOLLOW-UP (OUTPATIENT)
Age: 35
End: 2025-02-11

## 2025-02-21 DIAGNOSIS — K21.9 GASTROESOPHAGEAL REFLUX DISEASE WITHOUT ESOPHAGITIS: Primary | ICD-10-CM

## 2025-05-15 ENCOUNTER — TELEPHONE (OUTPATIENT)
Dept: GASTROENTEROLOGY | Facility: CLINIC | Age: 35
End: 2025-05-15

## 2025-05-15 NOTE — TELEPHONE ENCOUNTER
I called & lvm for the patient regarding rescheduling their OV with Kristine Quarles on 5/22. I asked them to call us back ASAP to reschedule.

## 2025-05-30 ENCOUNTER — OFFICE VISIT (OUTPATIENT)
Dept: GASTROENTEROLOGY | Facility: CLINIC | Age: 35
End: 2025-05-30
Payer: COMMERCIAL

## 2025-05-30 VITALS
WEIGHT: 156.6 LBS | TEMPERATURE: 98.6 F | DIASTOLIC BLOOD PRESSURE: 70 MMHG | HEIGHT: 73 IN | BODY MASS INDEX: 20.75 KG/M2 | SYSTOLIC BLOOD PRESSURE: 120 MMHG

## 2025-05-30 DIAGNOSIS — R13.10 DYSPHAGIA, UNSPECIFIED TYPE: ICD-10-CM

## 2025-05-30 DIAGNOSIS — K21.9 GASTROESOPHAGEAL REFLUX DISEASE WITHOUT ESOPHAGITIS: Primary | ICD-10-CM

## 2025-05-30 DIAGNOSIS — K20.0 EOSINOPHILIC ESOPHAGITIS: ICD-10-CM

## 2025-05-30 PROCEDURE — 99213 OFFICE O/P EST LOW 20 MIN: CPT | Performed by: PHYSICIAN ASSISTANT

## 2025-05-30 RX ORDER — OMEPRAZOLE 20 MG/1
20 CAPSULE, DELAYED RELEASE ORAL 2 TIMES DAILY
Qty: 180 CAPSULE | Refills: 2 | Status: SHIPPED | OUTPATIENT
Start: 2025-05-30

## 2025-05-30 NOTE — PROGRESS NOTES
Name: Estrada Gusman      : 1990      MRN: [id number]  Encounter Provider: Ivelisse Jordan PA-C  Encounter Date: 2025   Encounter department: [facility] [place]  :  Assessment & Plan  Gastroesophageal reflux disease without esophagitis  We reviewed recent esophageal manometry and pH study results.  Patient expressed understanding results.  All questions answered.  He has a history of GERD and EOE and we discussed these conditions at length.  Thankfully he is feeling much better overall compared to prior office visits.  He has no new complaints today or alarming symptoms.    Recommend patient continue with omeprazole 20 mg twice daily for now and monitor symptoms  We discussed ongoing GERD/gastritis diet and lifestyle.  I recommended patient avoid and limit fatty, greasy, spicy foods, excess caffeine, chocolate, alcohol, citrus foods, tomato sauces.  He expressed understanding to this.  We also discussed the importance of taking time while eating and remaining upright during and after meals.  He expressed understanding of this.  No plans for further workup at this time from a GI standpoint.  His symptoms are under good control at this time.  He will call our office with any questions or issues.  Orders:    omeprazole (PriLOSEC) 20 mg delayed release capsule; Take 1 capsule (20 mg total) by mouth in the morning and 1 capsule (20 mg total) before bedtime.    Eosinophilic esophagitis  As above.       Dysphagia, unspecified type  This has resolved with twice daily omeprazole.  Recent esophageal manometry revealed normal esophagus motility which is reassuring.         Patient was instructed to call the office with any questions, concerns, new/ worsening/ persisting GI symptoms. Advised patient go to the ER with any severe or worsening abdominal pain, fevers/ chills, intractable N/V, chest pain, SOB, dizziness, lightheadedness, feeling something stuck in esophagus that  will not go down. Patient expressed understanding and is in agreement with treatment plan.     Will plan to follow up in ~ 6 months   History of Present Illness   HPI  Estrada Gusman is a 35 y.o. male with a past medical history of asthma, allergic rhinitis, EOE, GERD who presents to the office today for follow-up.  Patient was last seen in the office by Dr. Sauceda 1/22/2025, previous office note was reviewed.  At last office visit it was recommended patient undergo esophageal manometry and 24-hour pH study.  Esophageal manometry and pH study was completed 2/6/2025, results reviewed today in the office.  Patient found to have normal esophageal motility.  pH study with findings consistent with breakthrough reflux given significant number of overall reflux episodes even while on PPI medication.      Patient reports doing well overall.  He has no new complaints today.  Since last office visit he changed from omeprazole 40 mg once daily to omeprazole 20 mg twice daily and he is feeling better overall.   He has rare breakthrough reflux symptoms at nighttime for which he uses famotidine/Pepcid as needed with relief.  Otherwise, he denies chronic heartburn or acid reflux on current medication regimen.  He is eating and drinking well.  He reports that when he was first diagnosed with EOE he had some difficulty swallowing but no longer.  His weight is stable from last visit.   Patient denies any current or recent fevers/ chills, unintentional weight loss, decreased appetite, abdominal pain, nausea, vomiting, change in bowel habits, diarrhea, constipation, black or bloody stools, dysphagia, odynophagia.   Denies chest pain, SOB    Last EGD was completed in April 2023 for history of GERD and eosinophilic esophagitis which was endoscopically normal.  Distal esophageal and proximal esophageal biopsies showed mild reactive change.  Distal esophagus biopsy showed 2 eosinophils per high-power field and proximal biopsy showed  "10 per high-power field respectively.  Gastric biopsy was negative for H. pylori.  Patient was initially diagnosed with EOE after EGD in 2021 at which time both distal and proximal esophageal biopsies showed eosinophils, greater than 10 per high-power field.  He was already on PPI at the time of this procedures.  For his EOE he was previously on swallowed fluticasone without much relief.    Review of Systems   Constitutional:  Negative for chills and fever.   HENT:  Negative for ear pain and sore throat.    Eyes:  Negative for pain and visual disturbance.   Respiratory:  Negative for cough and shortness of breath.    Cardiovascular:  Negative for chest pain and palpitations.   Gastrointestinal:  Negative for abdominal pain and vomiting.   Genitourinary:  Negative for dysuria and hematuria.   Musculoskeletal:  Negative for arthralgias and back pain.   Skin:  Negative for color change and rash.   Neurological:  Negative for seizures and syncope.   All other systems reviewed and are negative.    Objective   /70 (BP Location: Right arm, Patient Position: Sitting, Cuff Size: Standard)   Temp 98.6 °F (37 °C) (Tympanic)   Ht 6' 1\" (1.854 m)   Wt 71 kg (156 lb 9.6 oz)   BMI 20.66 kg/m²      Physical Exam  Vitals reviewed.   Constitutional:       General: He is not in acute distress.     Appearance: He is well-developed. He is not ill-appearing or diaphoretic.   HENT:      Head: Normocephalic and atraumatic.     Eyes:      Conjunctiva/sclera: Conjunctivae normal.       Cardiovascular:      Rate and Rhythm: Normal rate and regular rhythm.      Heart sounds: No murmur heard.  Pulmonary:      Effort: Pulmonary effort is normal. No respiratory distress.      Breath sounds: Normal breath sounds.   Abdominal:      General: There is no distension.      Palpations: Abdomen is soft.      Tenderness: There is no abdominal tenderness.     Musculoskeletal:         General: No swelling or tenderness.      Cervical back: Normal " range of motion and neck supple.     Skin:     General: Skin is warm and dry.      Capillary Refill: Capillary refill takes less than 2 seconds.     Neurological:      General: No focal deficit present.      Mental Status: He is alert and oriented to person, place, and time. Mental status is at baseline.     Psychiatric:         Mood and Affect: Mood normal.         Behavior: Behavior normal.         Thought Content: Thought content normal.

## 2025-05-30 NOTE — ASSESSMENT & PLAN NOTE
We reviewed recent esophageal manometry and pH study results.  Patient expressed understanding results.  All questions answered.  He has a history of GERD and EOE and we discussed these conditions at length.  Thankfully he is feeling much better overall compared to prior office visits.  He has no new complaints today or alarming symptoms.    Recommend patient continue with omeprazole 20 mg twice daily for now and monitor symptoms  We discussed ongoing GERD/gastritis diet and lifestyle.  I recommended patient avoid and limit fatty, greasy, spicy foods, excess caffeine, chocolate, alcohol, citrus foods, tomato sauces.  He expressed understanding to this.  We also discussed the importance of taking time while eating and remaining upright during and after meals.  He expressed understanding of this.  No plans for further workup at this time from a GI standpoint.  His symptoms are under good control at this time.  He will call our office with any questions or issues.  Orders:    omeprazole (PriLOSEC) 20 mg delayed release capsule; Take 1 capsule (20 mg total) by mouth in the morning and 1 capsule (20 mg total) before bedtime.

## 2025-08-21 ENCOUNTER — TELEPHONE (OUTPATIENT)
Age: 35
End: 2025-08-21